# Patient Record
Sex: MALE | Race: BLACK OR AFRICAN AMERICAN | NOT HISPANIC OR LATINO | Employment: FULL TIME | URBAN - METROPOLITAN AREA
[De-identification: names, ages, dates, MRNs, and addresses within clinical notes are randomized per-mention and may not be internally consistent; named-entity substitution may affect disease eponyms.]

---

## 2017-03-07 ENCOUNTER — OFFICE VISIT (OUTPATIENT)
Dept: FAMILY MEDICINE CLINIC | Facility: CLINIC | Age: 57
End: 2017-03-07

## 2017-03-07 VITALS
HEIGHT: 70 IN | TEMPERATURE: 97.2 F | SYSTOLIC BLOOD PRESSURE: 120 MMHG | BODY MASS INDEX: 32.35 KG/M2 | DIASTOLIC BLOOD PRESSURE: 74 MMHG | WEIGHT: 226 LBS | OXYGEN SATURATION: 99 % | HEART RATE: 64 BPM

## 2017-03-07 DIAGNOSIS — M05.79 RHEUMATOID ARTHRITIS INVOLVING MULTIPLE SITES WITH POSITIVE RHEUMATOID FACTOR (HCC): ICD-10-CM

## 2017-03-07 DIAGNOSIS — G44.011 INTRACTABLE EPISODIC CLUSTER HEADACHE: ICD-10-CM

## 2017-03-07 DIAGNOSIS — J06.9 ACUTE URI: Primary | ICD-10-CM

## 2017-03-07 DIAGNOSIS — K12.1 HARD PALATE ULCER: ICD-10-CM

## 2017-03-07 PROCEDURE — 99213 OFFICE O/P EST LOW 20 MIN: CPT | Performed by: FAMILY MEDICINE

## 2017-03-07 RX ORDER — HYDROCODONE BITARTRATE AND ACETAMINOPHEN 5; 325 MG/1; MG/1
1 TABLET ORAL EVERY 6 HOURS PRN
Qty: 20 TABLET | Refills: 0 | Status: SHIPPED | OUTPATIENT
Start: 2017-03-07 | End: 2019-01-15

## 2017-03-07 NOTE — PATIENT INSTRUCTIONS
Patient has been prescribed controlled medications.  Treatment discussed  FELIPA updated and reviewed.  Risk and Benefits discussed.  Per [place].

## 2017-03-07 NOTE — PROGRESS NOTES
Subjective   Marysol Rollins is a 56 y.o. male who is here for   Chief Complaint   Patient presents with   • Mouth Lesions     Patient states he has a sore in his mouth and he's had it for a couple weeks.    .     History of Present Illness   Has typical uri 2 weeks ago. Mostly ok now.  But a persistent sore on right palate remains and a dry cough, up from throat.    Would like refill on norco for his RA    The following portions of the patient's history were reviewed and updated as appropriate: allergies, current medications, past family history, past medical history, past social history, past surgical history and problem list.    Review of Systems    Objective   Physical Exam   Constitutional: He appears well-developed and well-nourished. No distress.   HENT:   Nose: Nose normal.   Mouth/Throat: Oropharynx is clear and moist. Oral lesions present.       Small ulcer   Cardiovascular: Normal rate.    Pulmonary/Chest: Effort normal.   Musculoskeletal: Normal range of motion.   Nursing note and vitals reviewed.      Assessment/Plan   Marysol was seen today for mouth lesions.    Diagnoses and all orders for this visit:    Acute URI    Intractable episodic cluster headache  -     HYDROcodone-acetaminophen (NORCO) 5-325 MG per tablet; Take 1 tablet by mouth Every 6 (Six) Hours As Needed for severe pain (7-10).    Rheumatoid arthritis involving multiple sites with positive rheumatoid factor    Hard palate ulcer      Patient Instructions   Patient has been prescribed controlled medications.  Treatment discussed  FELIPA updated and reviewed.  Risk and Benefits discussed.  Per [place]HB1.      Medications Discontinued During This Encounter   Medication Reason   • promethazine (PHENERGAN) 25 MG tablet Therapy completed   • HYDROcodone-acetaminophen (NORCO) 5-325 MG per tablet Reorder        Return in about 4 months (around 7/7/2017) for Annual physical.    Dr. Juan Fernandez  [facility]  [place], [place]

## 2017-03-28 RX ORDER — PRAVASTATIN SODIUM 40 MG
TABLET ORAL
Qty: 90 TABLET | Refills: 1 | Status: SHIPPED | OUTPATIENT
Start: 2017-03-28 | End: 2020-02-05 | Stop reason: SDUPTHER

## 2017-12-04 ENCOUNTER — HOSPITAL ENCOUNTER (OUTPATIENT)
Dept: LAB | Facility: HOSPITAL | Age: 57
Discharge: HOME | End: 2017-12-04
Attending: NURSE PRACTITIONER | Admitting: NURSE PRACTITIONER

## 2017-12-04 LAB
ALBUMIN SERPL-MCNC: 3.8 G/DL (ref 3.5–4.8)
ALBUMIN/GLOB SERPL: 1.2 {RATIO} (ref 1–1.7)
ALP SERPL-CCNC: 36 IU/L (ref 32–91)
ALT SERPL-CCNC: 19 IU/L (ref 17–63)
ANION GAP SERPL CALC-SCNC: 9.9 MMOL/L (ref 10–20)
AST SERPL-CCNC: 24 IU/L (ref 15–41)
BILIRUB SERPL-MCNC: 0.5 MG/DL (ref 0.3–1.2)
BUN SERPL-MCNC: 10 MG/DL (ref 8–20)
BUN/CREAT SERPL: 7.7 (ref 6.2–20.3)
CALCIUM SERPL-MCNC: 9.1 MG/DL (ref 8.9–10.3)
CCP IGG ANTIBODIES: 167.5 U/ML
CHLORIDE SERPL-SCNC: 106 MMOL/L (ref 101–111)
CHROMATIN AB SERPL-ACNC: <20 [IU]/ML (ref 0–20)
CONV CO2: 25 MMOL/L (ref 22–32)
CONV TOTAL PROTEIN: 7 G/DL (ref 6.1–7.9)
CREAT UR-MCNC: 1.3 MG/DL (ref 0.7–1.2)
CRP SERPL-MCNC: 0.06 MG/DL (ref 0–0.7)
ERYTHROCYTE [DISTWIDTH] IN BLOOD BY AUTOMATED COUNT: 13.5 % (ref 11.5–14.5)
ERYTHROCYTE [SEDIMENTATION RATE] IN BLOOD BY WESTERGREN METHOD: 7 MM/HR (ref 0–20)
GLOBULIN UR ELPH-MCNC: 3.2 G/DL (ref 2.5–3.8)
GLUCOSE SERPL-MCNC: 88 MG/DL (ref 65–99)
HCT VFR BLD AUTO: 44 % (ref 40–54)
HGB BLD-MCNC: 14.2 G/DL (ref 14–18)
MCH RBC QN AUTO: 27.7 PG (ref 26–32)
MCHC RBC AUTO-ENTMCNC: 32.3 G/DL (ref 32–36)
MCV RBC AUTO: 85.6 FL (ref 80–94)
PLATELET # BLD AUTO: 124 10*3/UL (ref 150–450)
PMV BLD AUTO: 10 FL (ref 7.4–10.4)
POTASSIUM SERPL-SCNC: 3.9 MMOL/L (ref 3.6–5.1)
RBC # BLD AUTO: 5.13 10*6/UL (ref 4.6–6)
SODIUM SERPL-SCNC: 137 MMOL/L (ref 136–144)
WBC # BLD AUTO: 4.6 10*3/UL (ref 4.5–11.5)

## 2018-04-09 ENCOUNTER — HOSPITAL ENCOUNTER (OUTPATIENT)
Dept: LAB | Facility: HOSPITAL | Age: 58
Discharge: HOME | End: 2018-04-09
Attending: NURSE PRACTITIONER | Admitting: NURSE PRACTITIONER

## 2018-04-09 ENCOUNTER — HOSPITAL ENCOUNTER (OUTPATIENT)
Dept: RHEUMATOLOGY | Facility: CLINIC | Age: 58
Discharge: HOME | End: 2018-04-09
Attending: NURSE PRACTITIONER | Admitting: NURSE PRACTITIONER

## 2018-04-09 LAB
ALBUMIN SERPL-MCNC: 3.8 G/DL (ref 3.5–4.8)
ALBUMIN/GLOB SERPL: 1.2 {RATIO} (ref 1–1.7)
ALP SERPL-CCNC: 38 IU/L (ref 32–91)
ALT SERPL-CCNC: 25 IU/L (ref 17–63)
ANION GAP SERPL CALC-SCNC: 10.9 MMOL/L (ref 10–20)
AST SERPL-CCNC: 23 IU/L (ref 15–41)
BILIRUB SERPL-MCNC: 0.5 MG/DL (ref 0.3–1.2)
BUN SERPL-MCNC: 14 MG/DL (ref 8–20)
BUN/CREAT SERPL: 10.8 (ref 6.2–20.3)
CALCIUM SERPL-MCNC: 9.2 MG/DL (ref 8.9–10.3)
CHLORIDE SERPL-SCNC: 104 MMOL/L (ref 101–111)
CONV CO2: 26 MMOL/L (ref 22–32)
CONV TOTAL PROTEIN: 7.1 G/DL (ref 6.1–7.9)
CREAT UR-MCNC: 1.3 MG/DL (ref 0.7–1.2)
CRP SERPL-MCNC: 0.05 MG/DL (ref 0–0.7)
ERYTHROCYTE [DISTWIDTH] IN BLOOD BY AUTOMATED COUNT: 13.2 % (ref 11.5–14.5)
GLOBULIN UR ELPH-MCNC: 3.3 G/DL (ref 2.5–3.8)
GLUCOSE SERPL-MCNC: 88 MG/DL (ref 65–99)
HCT VFR BLD AUTO: 43 % (ref 40–54)
HGB BLD-MCNC: 14.3 G/DL (ref 14–18)
MCH RBC QN AUTO: 27.9 PG (ref 26–32)
MCHC RBC AUTO-ENTMCNC: 33.2 G/DL (ref 32–36)
MCV RBC AUTO: 84 FL (ref 80–94)
PLATELET # BLD AUTO: 143 10*3/UL (ref 150–450)
PMV BLD AUTO: 9.6 FL (ref 7.4–10.4)
POTASSIUM SERPL-SCNC: 3.9 MMOL/L (ref 3.6–5.1)
RBC # BLD AUTO: 5.12 10*6/UL (ref 4.6–6)
SODIUM SERPL-SCNC: 137 MMOL/L (ref 136–144)
WBC # BLD AUTO: 5.9 10*3/UL (ref 4.5–11.5)

## 2019-01-15 ENCOUNTER — OFFICE VISIT (OUTPATIENT)
Dept: FAMILY MEDICINE CLINIC | Facility: CLINIC | Age: 59
End: 2019-01-15

## 2019-01-15 VITALS
WEIGHT: 224 LBS | HEIGHT: 70 IN | DIASTOLIC BLOOD PRESSURE: 78 MMHG | BODY MASS INDEX: 32.07 KG/M2 | OXYGEN SATURATION: 98 % | SYSTOLIC BLOOD PRESSURE: 118 MMHG | HEART RATE: 69 BPM

## 2019-01-15 DIAGNOSIS — Z00.00 ROUTINE ADULT HEALTH MAINTENANCE: ICD-10-CM

## 2019-01-15 DIAGNOSIS — N52.03 COMBINED ARTERIAL INSUFFICIENCY AND CORPORO-VENOUS OCCLUSIVE ERECTILE DYSFUNCTION: ICD-10-CM

## 2019-01-15 DIAGNOSIS — E78.2 MIXED HYPERLIPIDEMIA: ICD-10-CM

## 2019-01-15 DIAGNOSIS — Z12.5 SCREENING FOR PROSTATE CANCER: ICD-10-CM

## 2019-01-15 DIAGNOSIS — M05.79 RHEUMATOID ARTHRITIS INVOLVING MULTIPLE SITES WITH POSITIVE RHEUMATOID FACTOR (HCC): Primary | ICD-10-CM

## 2019-01-15 DIAGNOSIS — Z51.81 MEDICATION MONITORING ENCOUNTER: ICD-10-CM

## 2019-01-15 PROCEDURE — 99396 PREV VISIT EST AGE 40-64: CPT | Performed by: FAMILY MEDICINE

## 2019-01-15 PROCEDURE — 90674 CCIIV4 VAC NO PRSV 0.5 ML IM: CPT | Performed by: FAMILY MEDICINE

## 2019-01-15 PROCEDURE — 90471 IMMUNIZATION ADMIN: CPT | Performed by: FAMILY MEDICINE

## 2019-01-15 RX ORDER — MELATONIN
1000 DAILY
COMMUNITY

## 2019-01-15 RX ORDER — CELECOXIB 200 MG/1
200 CAPSULE ORAL DAILY
Qty: 30 CAPSULE | Refills: 1 | Status: SHIPPED | OUTPATIENT
Start: 2019-01-15 | End: 2019-04-16 | Stop reason: SDUPTHER

## 2019-01-15 RX ORDER — SILDENAFIL CITRATE 20 MG/1
20 TABLET ORAL DAILY PRN
Qty: 30 TABLET | Refills: 5 | Status: SHIPPED | OUTPATIENT
Start: 2019-01-15 | End: 2020-02-05 | Stop reason: SDUPTHER

## 2019-01-15 RX ORDER — HYDROXYCHLOROQUINE SULFATE 200 MG/1
200 TABLET, FILM COATED ORAL 2 TIMES DAILY
COMMUNITY
Start: 2018-12-12 | End: 2019-10-15 | Stop reason: SDUPTHER

## 2019-01-15 RX ORDER — LANOLIN ALCOHOL/MO/W.PET/CERES
CREAM (GRAM) TOPICAL DAILY
COMMUNITY
End: 2022-08-23

## 2019-01-15 NOTE — PROGRESS NOTES
"  Chief Complaint   Patient presents with   • Annual Exam   • Back Pain   • Rheumatoid Arthritis   • Hyperlipidemia   • Erectile Dysfunction       Subjective   Marysol Rollins is a 58 y.o. male and is here for a yearly physical exam. The patient reports problems - rheumatoid is stable, on Plaquenil now. ED is a worsening problem.    Do you take any herbs or supplements that were not prescribed by a doctor? yes. If so, these will be added to active medication list.    The following portions of the patient's history were reviewed and updated as appropriate: allergies, current medications, past family history, past medical history, past social history, past surgical history and problem list.    Social and Family and Surgical History reviewed and updated today, see Rooming tab.    Health History, Preventive Measures and Vaccination flow sheets reviewed and updated today.    Patient's current medical chart in Epic; including previous office notes, imaging, labs, specialist's evaluation either in notes or in Media tab reviewed today.    Other pertinent medical information also reviewed thru Care Everywhere function is also reviewed today.    Review of Systems  Review of Systems  A comprehensive review of systems was negative except for: Genitourinary: positive for sexual problems and decreased stream  Musculoskeletal: positive for arthralgias, back pain and stiff joints    Vitals:    01/15/19 1505   BP: 118/78   BP Location: Left arm   Patient Position: Sitting   Pulse: 69   SpO2: 98%   Weight: 102 kg (224 lb)   Height: 177.8 cm (70\")       General Appearance:  Alert, cooperative, no distress, appears stated age   Head:  Normocephalic, without obvious abnormality, atraumatic   Eyes:  PERRL, conjunctiva/corneas clear, EOM's intact.   Ears:  Normal TM's and external ear canals, both ears   Nose: Nares normal, septum midline, mucosa normal, no drainage or sinus tenderness   Throat: Lips, mucosa, and tongue normal; teeth and " gums normal   Neck: Supple, symmetrical, trachea midline, no adenopathy;   thyroid: No enlargement/tenderness/nodules; no carotid  bruit   Back:  Symmetric, no curvature, ROM normal, no CVA tenderness   Lungs:  Clear to auscultation bilaterally, respirations unlabored   Chest wall:  No tenderness or deformity   Heart:  Regular rate and rhythm, S1 and S2 normal, no murmur, rub or gallop   Abdomen:  Soft, non-tender, bowel sounds active all four quadrants,   no masses, no organomegaly   Rectal:        Extremities: Extremities normal, atraumatic, no cyanosis or edema   Pulses: 2+ and symmetric all extremities   Skin: Skin color, texture, turgor normal, no rashes or lesions   Lymph nodes: Cervical, supraclavicular, and axillary nodes normal   Neurologic: CNII-XII intact. Normal strength, sensation and reflexes   throughout            Assessment/Plan   Healthy male exam.  Marysol was seen today for annual exam, back pain, rheumatoid arthritis, hyperlipidemia and erectile dysfunction.    Diagnoses and all orders for this visit:    Rheumatoid arthritis involving multiple sites with positive rheumatoid factor (CMS/HCC)  -     celecoxib (CELEBREX) 200 MG capsule; Take 1 capsule by mouth Daily.    Mixed hyperlipidemia  -     Lipid Panel  -     TSH    Medication monitoring encounter    Routine adult health maintenance  -     Hemoglobin A1c  -     Basic Metabolic Panel    Combined arterial insufficiency and corporo-venous occlusive erectile dysfunction  -     sildenafil (REVATIO) 20 MG tablet; Take 1 tablet by mouth Daily As Needed (ED).    Screening for prostate cancer  -     PSA Screen    Other orders  -     Flucelvax Quad=>4Years (5747-9009)      1. Labs ordered  Try Viagra for ED  Try Celebrex on non prednisone days  2. Patient Counseling:  --Nutrition: Stressed importance of moderation in sodium/caffeine intake, saturated fat and cholesterol.  Discussed caloric balance, sufficient intake of fresh fruits, vegetables,  fiber, calcium, iron.  --Discussed the daily use of baby aspirin, if indicated.not needed  --Exercise: Stressed the importance of regular exercise.   --Substance Abuse: Discussed cessation/primary prevention of tobacco, alcohol, or other drug use; driving or other dangerous activities under the influence.    --Dental health: Discussed importance of regular tooth brushing, flossing, and dental visits.  -- suggested having eyes and vision checked if needed or past due.  --Immunizations reviewed.flu shot today  --Discussed benefits of screening colonoscopy.  3. Discussed the patient's BMI with him.  The BMI is above average; BMI management plan is completed  4. Follow up in one year    There are no Patient Instructions on file for this visit.    Medications Discontinued During This Encounter   Medication Reason   • SUMAtriptan (IMITREX) 50 MG tablet *Therapy completed   • HYDROcodone-acetaminophen (NORCO) 5-325 MG per tablet *Therapy completed   • gabapentin (NEURONTIN) 100 MG capsule *Therapy completed        Dr. Juan Fernandez MD  [place], [place]  [facility]

## 2019-04-16 DIAGNOSIS — M05.79 RHEUMATOID ARTHRITIS INVOLVING MULTIPLE SITES WITH POSITIVE RHEUMATOID FACTOR (HCC): ICD-10-CM

## 2019-04-16 RX ORDER — CELECOXIB 200 MG/1
CAPSULE ORAL
Qty: 30 CAPSULE | Refills: 0 | Status: SHIPPED | OUTPATIENT
Start: 2019-04-16 | End: 2019-05-29 | Stop reason: SDUPTHER

## 2019-04-29 ENCOUNTER — TELEPHONE (OUTPATIENT)
Dept: FAMILY MEDICINE CLINIC | Facility: CLINIC | Age: 59
End: 2019-04-29

## 2019-04-29 RX ORDER — MONTELUKAST SODIUM 10 MG/1
10 TABLET ORAL NIGHTLY
Qty: 30 TABLET | Refills: 1 | Status: SHIPPED | OUTPATIENT
Start: 2019-04-29 | End: 2020-02-05 | Stop reason: SDUPTHER

## 2019-04-29 NOTE — TELEPHONE ENCOUNTER
Pt called requesting something to be called in for his allergies. He has Tried Zyrtec and Claritin OTC without relief.    Ok singulair sent in

## 2019-05-29 DIAGNOSIS — M05.79 RHEUMATOID ARTHRITIS INVOLVING MULTIPLE SITES WITH POSITIVE RHEUMATOID FACTOR (HCC): ICD-10-CM

## 2019-05-29 RX ORDER — CELECOXIB 200 MG/1
CAPSULE ORAL
Qty: 30 CAPSULE | Refills: 1 | Status: SHIPPED | OUTPATIENT
Start: 2019-05-29 | End: 2019-08-17 | Stop reason: SDUPTHER

## 2019-07-03 RX ORDER — PREDNISONE 1 MG/1
TABLET ORAL
Qty: 30 TABLET | Refills: 0 | Status: SHIPPED | OUTPATIENT
Start: 2019-07-03 | End: 2019-10-15 | Stop reason: SDUPTHER

## 2019-08-17 DIAGNOSIS — M05.79 RHEUMATOID ARTHRITIS INVOLVING MULTIPLE SITES WITH POSITIVE RHEUMATOID FACTOR (HCC): ICD-10-CM

## 2019-08-17 RX ORDER — HYDROXYCHLOROQUINE SULFATE 200 MG/1
TABLET, FILM COATED ORAL
Qty: 180 TABLET | Refills: 0 | OUTPATIENT
Start: 2019-08-17

## 2019-08-17 RX ORDER — PREDNISONE 1 MG/1
TABLET ORAL
Qty: 30 TABLET | Refills: 0 | OUTPATIENT
Start: 2019-08-17

## 2019-08-19 RX ORDER — CELECOXIB 200 MG/1
CAPSULE ORAL
Qty: 30 CAPSULE | Refills: 0 | Status: SHIPPED | OUTPATIENT
Start: 2019-08-19 | End: 2019-09-18 | Stop reason: SDUPTHER

## 2019-08-27 RX ORDER — HYDROXYCHLOROQUINE SULFATE 200 MG/1
TABLET, FILM COATED ORAL
Qty: 180 TABLET | Refills: 0 | OUTPATIENT
Start: 2019-08-27

## 2019-08-27 RX ORDER — PREDNISONE 1 MG/1
TABLET ORAL
Qty: 30 TABLET | Refills: 0 | OUTPATIENT
Start: 2019-08-27

## 2019-09-13 ENCOUNTER — TELEPHONE (OUTPATIENT)
Dept: FAMILY MEDICINE CLINIC | Facility: CLINIC | Age: 59
End: 2019-09-13

## 2019-09-13 NOTE — TELEPHONE ENCOUNTER
Pt called stating his rheumatologist  Has retired, wondering if you had any recommendations on who to see?

## 2019-09-13 NOTE — TELEPHONE ENCOUNTER
Pt informed, gave him the phone number to the office he will check on the hours and will call back.

## 2019-09-18 ENCOUNTER — TELEPHONE (OUTPATIENT)
Dept: FAMILY MEDICINE CLINIC | Facility: CLINIC | Age: 59
End: 2019-09-18

## 2019-09-18 DIAGNOSIS — M05.79 RHEUMATOID ARTHRITIS INVOLVING MULTIPLE SITES WITH POSITIVE RHEUMATOID FACTOR (HCC): ICD-10-CM

## 2019-09-18 DIAGNOSIS — M05.711 RHEUMATOID ARTHRITIS INVOLVING RIGHT SHOULDER WITH POSITIVE RHEUMATOID FACTOR (HCC): Primary | ICD-10-CM

## 2019-09-18 RX ORDER — CELECOXIB 200 MG/1
CAPSULE ORAL
Qty: 30 CAPSULE | Refills: 0 | Status: SHIPPED | OUTPATIENT
Start: 2019-09-18 | End: 2019-10-28 | Stop reason: SDUPTHER

## 2019-09-18 NOTE — TELEPHONE ENCOUNTER
Pt called, can the referral be made for the rheumatologist you recommended?      Ok  Referral made    Juan Fernandez MD

## 2019-10-10 PROBLEM — M25.512 PAIN IN JOINT OF LEFT SHOULDER: Status: ACTIVE | Noted: 2017-12-04

## 2019-10-10 PROBLEM — D69.6 THROMBOCYTOPENIA: Status: ACTIVE | Noted: 2018-04-09

## 2019-10-10 PROBLEM — M70.60 TROCHANTERIC BURSITIS: Status: ACTIVE | Noted: 2018-04-09

## 2019-10-15 ENCOUNTER — OFFICE VISIT (OUTPATIENT)
Dept: RHEUMATOLOGY | Facility: CLINIC | Age: 59
End: 2019-10-15

## 2019-10-15 VITALS
HEIGHT: 70 IN | WEIGHT: 233 LBS | BODY MASS INDEX: 33.36 KG/M2 | HEART RATE: 64 BPM | DIASTOLIC BLOOD PRESSURE: 84 MMHG | SYSTOLIC BLOOD PRESSURE: 134 MMHG

## 2019-10-15 DIAGNOSIS — M70.61 TROCHANTERIC BURSITIS OF BOTH HIPS: ICD-10-CM

## 2019-10-15 DIAGNOSIS — M05.79 RHEUMATOID ARTHRITIS WITH RHEUMATOID FACTOR OF MULTIPLE SITES WITHOUT ORGAN OR SYSTEMS INVOLVEMENT (HCC): Primary | ICD-10-CM

## 2019-10-15 DIAGNOSIS — M70.62 TROCHANTERIC BURSITIS OF BOTH HIPS: ICD-10-CM

## 2019-10-15 PROCEDURE — 99213 OFFICE O/P EST LOW 20 MIN: CPT | Performed by: NURSE PRACTITIONER

## 2019-10-15 RX ORDER — HYDROXYCHLOROQUINE SULFATE 200 MG/1
200 TABLET, FILM COATED ORAL 2 TIMES DAILY
Qty: 180 TABLET | Refills: 1 | Status: SHIPPED | OUTPATIENT
Start: 2019-10-15

## 2019-10-15 RX ORDER — PREDNISONE 1 MG/1
5 TABLET ORAL DAILY
Qty: 30 TABLET | Refills: 0 | Status: SHIPPED | OUTPATIENT
Start: 2019-10-15 | End: 2021-02-04

## 2019-10-15 NOTE — PATIENT INSTRUCTIONS
Out of meds X 2 months due to not being seen for over 6 mos.   labs today. X-ray of hands & feet to check for disease progression  Plaquenil 400 mg/d, prednisone taper for symptoms and then may use as needed only for flares.

## 2019-10-15 NOTE — PROGRESS NOTES
Subjective   Marsyol Rollins is a 59 y.o. male.     History of Present Illness     Pt is a pleasant 59 y.o.  male pt with history of RA and bursitis of the hips. Pt here for follow up today. Last seen in the office in February 2019. Labs in March w/ PCP showed normal CRP and ESR, , +IOANA, CBC showed no leukopenia, anemia or thrombocytopenia  Labs on 8-25-18 showed slightly low platelet count at 139, normal CRP & normal creatinine.      He had hand xray in April 2018 that was negative.     Has been out of plaquenil & prednisone for a few months. Was taking plaquenil 400 mg/d. Last eye examination was in July 2019 and was ok for plaquenil.  Having increased pain over the hands, feet & hips. Hands will swell intermittently. Was doing better when he had his medications. Takes prednisone on as needed basis a few times a month if needed.    He tried pennsaid to the hips for bursitis and this does help  Stiffness lasts about a hour, hands, feet ache. Hands swell.      ROS: Denies fever/chills. NO nasal or oral lesions. Dneies dry eyes or mouth. No skin rashes or psoriasis, no CP, SOA, N/V/D. Energy level is slightly low. Feels sluggish at times. Wants to try to work out more. Denies dysphagia or raynauds. Denies   diabetes or thyroid disease. The remainder of the review of systems reviewed and are (-).                            The following portions of the patient's history were reviewed and updated as appropriate: current medications, past family history, past medical history, past social history, past surgical history and problem list.       Review of Systems   Constitutional:        See HPI       Objective   Physical Exam   Constitutional: He is oriented to person, place, and time. He appears well-developed and well-nourished.   HENT:   Head: Normocephalic and atraumatic.   Nose: Nose normal.   Eyes: Conjunctivae are normal. Pupils are equal, round, and reactive to light.   Cardiovascular: Normal rate  and regular rhythm.   Pulmonary/Chest: Effort normal and breath sounds normal.   Musculoskeletal:   Mild decreased ROM Over the bilateral hips and bilateral MCPs, PIPs  He has some tenderness one examination over the bilateral PIP joints. Tenderness is noted over the bilateral trochanteric bursa.   No swelling is noted on examination.    Neurological: He is alert and oriented to person, place, and time.   Skin: Skin is warm and dry.   Psychiatric: He has a normal mood and affect.   Vitals reviewed.        Assessment/Plan   Marysol was seen today for rheumatoid arthritis.    Diagnoses and all orders for this visit:    Rheumatoid arthritis with rheumatoid factor of multiple sites without organ or systems involvement (CMS/[facility])  Comments:  Out of meds X 2 months  labs today. X-ray of hands & feet to check for disease progression  Plaquenil 400 mg/d, prednisone taper for symptoms.  Orders:  -     CBC With Manual Differential; Future  -     Comprehensive Metabolic Panel; Future  -     C-reactive Protein; Future  -     C3 Complement; Future  -     C4 Complement; Future  -     Urinalysis With Culture If Indicated -; Future  -     Sedimentation Rate; Future  -     XR Hand 3+ View Bilateral; Future  -     XR Foot 3+ View Bilateral; Future    Trochanteric bursitis of both hips  Comments:  May use moist heat, stretches, pennsaid as needed  If no improvement w/ conservative measures pt will let me know        Patient Instructions   Out of meds X 2 months due to not being seen for over 6 mos.   labs today. X-ray of hands & feet to check for disease progression  Plaquenil 400 mg/d, prednisone taper for symptoms and then may use as needed only for flares.

## 2019-10-28 DIAGNOSIS — M05.79 RHEUMATOID ARTHRITIS INVOLVING MULTIPLE SITES WITH POSITIVE RHEUMATOID FACTOR (HCC): ICD-10-CM

## 2019-10-28 RX ORDER — CELECOXIB 200 MG/1
CAPSULE ORAL
Qty: 30 CAPSULE | Refills: 0 | Status: SHIPPED | OUTPATIENT
Start: 2019-10-28 | End: 2020-01-14

## 2020-01-13 DIAGNOSIS — M05.79 RHEUMATOID ARTHRITIS INVOLVING MULTIPLE SITES WITH POSITIVE RHEUMATOID FACTOR (HCC): ICD-10-CM

## 2020-01-14 RX ORDER — CELECOXIB 200 MG/1
CAPSULE ORAL
Qty: 30 CAPSULE | Refills: 0 | Status: SHIPPED | OUTPATIENT
Start: 2020-01-14 | End: 2020-02-05 | Stop reason: SDUPTHER

## 2020-02-05 ENCOUNTER — OFFICE VISIT (OUTPATIENT)
Dept: FAMILY MEDICINE CLINIC | Facility: CLINIC | Age: 60
End: 2020-02-05

## 2020-02-05 VITALS
WEIGHT: 232 LBS | BODY MASS INDEX: 33.21 KG/M2 | HEIGHT: 70 IN | SYSTOLIC BLOOD PRESSURE: 120 MMHG | DIASTOLIC BLOOD PRESSURE: 78 MMHG | HEART RATE: 62 BPM | OXYGEN SATURATION: 100 %

## 2020-02-05 DIAGNOSIS — M05.79 RHEUMATOID ARTHRITIS WITH RHEUMATOID FACTOR OF MULTIPLE SITES WITHOUT ORGAN OR SYSTEMS INVOLVEMENT (HCC): ICD-10-CM

## 2020-02-05 DIAGNOSIS — J30.2 SEASONAL ALLERGIES: ICD-10-CM

## 2020-02-05 DIAGNOSIS — E78.2 MIXED HYPERLIPIDEMIA: ICD-10-CM

## 2020-02-05 DIAGNOSIS — Z00.00 ROUTINE ADULT HEALTH MAINTENANCE: Primary | ICD-10-CM

## 2020-02-05 DIAGNOSIS — Z12.11 SCREENING FOR COLON CANCER: ICD-10-CM

## 2020-02-05 DIAGNOSIS — Z12.5 SCREENING FOR PROSTATE CANCER: ICD-10-CM

## 2020-02-05 DIAGNOSIS — N52.03 COMBINED ARTERIAL INSUFFICIENCY AND CORPORO-VENOUS OCCLUSIVE ERECTILE DYSFUNCTION: ICD-10-CM

## 2020-02-05 DIAGNOSIS — M05.79 RHEUMATOID ARTHRITIS INVOLVING MULTIPLE SITES WITH POSITIVE RHEUMATOID FACTOR (HCC): ICD-10-CM

## 2020-02-05 PROBLEM — M25.512 PAIN IN JOINT OF LEFT SHOULDER: Status: RESOLVED | Noted: 2017-12-04 | Resolved: 2020-02-05

## 2020-02-05 PROBLEM — M70.60 TROCHANTERIC BURSITIS: Status: RESOLVED | Noted: 2018-04-09 | Resolved: 2020-02-05

## 2020-02-05 PROCEDURE — 99396 PREV VISIT EST AGE 40-64: CPT | Performed by: FAMILY MEDICINE

## 2020-02-05 RX ORDER — PRAVASTATIN SODIUM 40 MG
40 TABLET ORAL DAILY
Qty: 90 TABLET | Refills: 3 | Status: SHIPPED | OUTPATIENT
Start: 2020-02-05 | End: 2020-07-23

## 2020-02-05 RX ORDER — SILDENAFIL CITRATE 20 MG/1
20 TABLET ORAL DAILY PRN
Qty: 30 TABLET | Refills: 5 | Status: SHIPPED | OUTPATIENT
Start: 2020-02-05 | End: 2021-08-16 | Stop reason: SDUPTHER

## 2020-02-05 RX ORDER — CELECOXIB 200 MG/1
200 CAPSULE ORAL DAILY
Qty: 90 CAPSULE | Refills: 3 | Status: SHIPPED | OUTPATIENT
Start: 2020-02-05 | End: 2021-08-16 | Stop reason: SDUPTHER

## 2020-02-05 RX ORDER — MONTELUKAST SODIUM 10 MG/1
10 TABLET ORAL NIGHTLY
Qty: 30 TABLET | Refills: 1 | Status: SHIPPED | OUTPATIENT
Start: 2020-02-05 | End: 2022-01-19

## 2020-02-05 NOTE — PROGRESS NOTES
"  Chief Complaint   Patient presents with   • Annual Exam       Subjective   Marysol Rollins is a 59 y.o. male and is here for a yearly physical exam. The patient reports problems - will be seeing sylvia Kaye MD in March. dr De Luna has retired.    Do you take any herbs or supplements that were not prescribed by a doctor? yes. If so, these will be added to active medication list.    The following portions of the patient's history were reviewed and updated as appropriate: allergies, current medications, past family history, past medical history, past social history, past surgical history and problem list.    Social and Family and Surgical History reviewed and updated today, see Rooming tab.    Health History, Preventive Measures and Vaccination flow sheets reviewed and updated today.    Patient's current medical chart in Epic; including previous office notes, imaging, labs, specialist's evaluation either in notes or in Media tab reviewed today.    Other pertinent medical information also reviewed thru Care Everywhere function is also reviewed today.    Review of Systems  Review of Systems  A comprehensive review of systems was negative except for: Musculoskeletal: positive for stiff joints    Vitals:    02/05/20 1508   BP: 120/78   BP Location: Left arm   Patient Position: Sitting   Cuff Size: Adult   Pulse: 62   SpO2: 100%   Weight: 105 kg (232 lb)   Height: 177.8 cm (70\")       General Appearance:  Alert, cooperative, no distress, appears stated age   Head:  Normocephalic, without obvious abnormality, atraumatic   Eyes:  PERRL, conjunctiva/corneas clear, EOM's intact.   Ears:  Normal TM's and external ear canals, both ears   Nose: Nares normal, septum midline, mucosa normal, no drainage or sinus tenderness   Throat: Lips, mucosa, and tongue normal; teeth and gums normal   Neck: Supple, symmetrical, trachea midline, no adenopathy;   thyroid: No enlargement/tenderness/nodules; no carotid  bruit   Back:  Symmetric, no " curvature, ROM normal, no CVA tenderness   Lungs:  Clear to auscultation bilaterally, respirations unlabored   Chest wall:  No tenderness or deformity   Heart:  Regular rate and rhythm, S1 and S2 normal, no murmur, rub or gallop   Abdomen:  Soft, non-tender, bowel sounds active all four quadrants,   no masses, no organomegaly   Rectal:        Extremities: Extremities normal, atraumatic, no cyanosis or edema   Pulses: 2+ and symmetric all extremities   Skin: Skin color, texture, turgor normal, no rashes or lesions   Lymph nodes: Cervical, supraclavicular, and axillary nodes normal   Neurologic: CNII-XII intact. Normal strength, sensation and reflexes   throughout            Assessment/Plan   Healthy male exam.  Marysol was seen today for annual exam.    Diagnoses and all orders for this visit:    Routine adult health maintenance  -     TSH  -     PSA Screen  -     LDL Cholesterol, Direct    Rheumatoid arthritis with rheumatoid factor of multiple sites without organ or systems involvement (CMS/HCC)    Mixed hyperlipidemia  -     TSH  -     LDL Cholesterol, Direct  -     pravastatin (PRAVACHOL) 40 MG tablet; Take 1 tablet by mouth Daily.    Screening for prostate cancer  -     PSA Screen    Screening for colon cancer  -     Ambulatory Referral For Screening Colonoscopy    Rheumatoid arthritis involving multiple sites with positive rheumatoid factor (CMS/HCC)  -     celecoxib (CeleBREX) 200 MG capsule; Take 1 capsule by mouth Daily. Indications: Rheumatoid Arthritis    Combined arterial insufficiency and corporo-venous occlusive erectile dysfunction  -     sildenafil (REVATIO) 20 MG tablet; Take 1 tablet by mouth Daily As Needed (ED).    Seasonal allergies  -     montelukast (SINGULAIR) 10 MG tablet; Take 1 tablet by mouth Every Night.      1. No new labs  2. Patient Counseling:  --Nutrition: Stressed importance of moderation in sodium/caffeine intake, saturated fat and cholesterol.  Discussed caloric balance,  sufficient intake of fresh fruits, vegetables, fiber, calcium, iron.  ---Exercise: Stressed the importance of regular exercise.   --Substance Abuse: Discussed cessation/primary prevention of tobacco, alcohol, or other drug use; driving or other dangerous activities under the influence.    --Dental health: Discussed importance of regular tooth brushing, flossing, and dental visits.  -- suggested having eyes and vision checked if needed or past due.  --Immunizations reviewed.  --Discussed benefits of screening colonoscopy.is due, for 10 yr f/u. Referral made  3. Discussed the patient's BMI with him.  The BMI is above average; BMI management plan is completed  4. Follow up in one year    There are no Patient Instructions on file for this visit.    Medications Discontinued During This Encounter   Medication Reason   • folic acid (FOLVITE) 1 MG tablet *Therapy completed   • celecoxib (CeleBREX) 200 MG capsule Reorder   • montelukast (SINGULAIR) 10 MG tablet Reorder   • pravastatin (PRAVACHOL) 40 MG tablet Reorder   • sildenafil (REVATIO) 20 MG tablet Reorder        Dr. Juan Fernandez MD  [place], [place]  [facility]

## 2020-02-06 LAB
LDLC SERPL DIRECT ASSAY-MCNC: 146 MG/DL (ref 0–100)
PSA SERPL-MCNC: 2.35 NG/ML (ref 0–4)
TSH SERPL DL<=0.005 MIU/L-ACNC: 1.56 UIU/ML (ref 0.27–4.2)

## 2020-03-12 ENCOUNTER — TELEPHONE (OUTPATIENT)
Dept: FAMILY MEDICINE CLINIC | Facility: CLINIC | Age: 60
End: 2020-03-12

## 2020-03-26 ENCOUNTER — TELEPHONE (OUTPATIENT)
Dept: FAMILY MEDICINE CLINIC | Facility: CLINIC | Age: 60
End: 2020-03-26

## 2020-03-26 NOTE — TELEPHONE ENCOUNTER
I called pt Re: Hosp. F/U appt on 4/7/2020, he said that he wanted to cancel it for now.  I offered Video Visit options and he was not interested in that.  He just wanted to make sure that Dr. HERNANDEZ seen his hospital records ( they are under the Media tab), he said that he is feeling much better but isn't comfortable getting out right now.  He said that he does live in [place] and they aren't supposed to be going anywhere right now.

## 2020-04-09 ENCOUNTER — TELEPHONE (OUTPATIENT)
Dept: FAMILY MEDICINE CLINIC | Facility: CLINIC | Age: 60
End: 2020-04-09

## 2020-04-09 NOTE — TELEPHONE ENCOUNTER
Returned call to pt regarding allergy med questions. He stated he never got it. I advised that he call his pharmacy to verify they received it and if not he can either call us or they can call.

## 2020-05-27 RX ORDER — PREDNISONE 1 MG/1
TABLET ORAL
Qty: 30 TABLET | Refills: 0 | OUTPATIENT
Start: 2020-05-27

## 2020-07-23 ENCOUNTER — OFFICE VISIT (OUTPATIENT)
Dept: FAMILY MEDICINE CLINIC | Facility: CLINIC | Age: 60
End: 2020-07-23

## 2020-07-23 VITALS
HEART RATE: 61 BPM | HEIGHT: 70 IN | WEIGHT: 215.6 LBS | OXYGEN SATURATION: 99 % | SYSTOLIC BLOOD PRESSURE: 130 MMHG | BODY MASS INDEX: 30.86 KG/M2 | DIASTOLIC BLOOD PRESSURE: 86 MMHG

## 2020-07-23 DIAGNOSIS — M05.79 RHEUMATOID ARTHRITIS WITH RHEUMATOID FACTOR OF MULTIPLE SITES WITHOUT ORGAN OR SYSTEMS INVOLVEMENT (HCC): ICD-10-CM

## 2020-07-23 DIAGNOSIS — Z12.11 SCREENING FOR COLON CANCER: ICD-10-CM

## 2020-07-23 DIAGNOSIS — I25.119 CORONARY ARTERY DISEASE INVOLVING NATIVE CORONARY ARTERY OF NATIVE HEART WITH ANGINA PECTORIS (HCC): ICD-10-CM

## 2020-07-23 DIAGNOSIS — N43.3 RIGHT HYDROCELE: Primary | ICD-10-CM

## 2020-07-23 PROCEDURE — 99214 OFFICE O/P EST MOD 30 MIN: CPT | Performed by: FAMILY MEDICINE

## 2020-07-23 RX ORDER — NITROGLYCERIN 0.4 MG/1
0.4 TABLET SUBLINGUAL
COMMUNITY
End: 2023-02-13 | Stop reason: SDUPTHER

## 2020-07-23 RX ORDER — CLOPIDOGREL BISULFATE 75 MG/1
75 TABLET ORAL DAILY
COMMUNITY
End: 2022-01-19 | Stop reason: SDUPTHER

## 2020-07-23 RX ORDER — ATORVASTATIN CALCIUM 40 MG/1
40 TABLET, FILM COATED ORAL DAILY
COMMUNITY
End: 2022-01-19 | Stop reason: SDUPTHER

## 2020-07-23 NOTE — PROGRESS NOTES
Subjective   Marysol Rollins is a 59 y.o. male who is here for   Chief Complaint   Patient presents with   • Rectal Bleeding     saw twice after bowel movement 2-3 days ago    • Testicle Pain   • Coronary Artery Disease   .     History of Present Illness     Mr. Rollins returns today with multiple problems.  On top of list he suffered a myocardial infarction back in March of this year.  He had 3 days of fairly classic sounding angina went to [facility] where he was admitted.  And had 2 stents placed.  Not sure of the name of his cardiologist at this point.  But has had 2 follow-ups.  Update his med list to add Plavix and move his statin from pravastatin or Lipitor.  Is having no chest pain at this point.    The fullness in his right scrotum is getting worse.  We examine his about a year ago his clinical diagnosis was a hydrocele.  But it seems begetting larger with some mild discomfort.  On exam today it is about the size of a racquetball on the right.  Left testicle feels fairly normal, will refer him to see Dr. Jamari Oates urology over in [facility] for follow-up.    Patient also had bright red blood on the toilet paper after BM this occurred 3 days ago for 2 BMs in a row.  He has a history of hemorrhoids and anal fissure.  Last BM last couple days or is been no blood.  We discussed the fact he is on Plavix at this point.    Also reports that he routinely bites the right posterior tongue due to a partial on his teeth it does not fit very well and he spit up some blood for day due to that.  Again probably due to the Plavix and the bleeding has since stopped.    We discussed his BPH was on Flomax at one time but seemed to have retrograde ejaculation.  He is now on a over-the-counter supplement which has saw palmetto amongst a lot of other things.  We read the ingredients as a high level vitamin E which would not be a good idea with somebody has had a heart attack with stents asked him to throw this  so went away and go back to Lisa duffy by just plain saw palmetto.    His rheumatoid arthritis is fairly stable on Celebrex and Plaquenil he is current with his rheumatologist    Lastly he missed his screening colonoscopy due to COVID-19 pandemic.  I gave him Dr. Tristan Herr's name and number at gastroenterology of [facility]            The following portions of the patient's history were reviewed and updated as appropriate: allergies, current medications, past family history, past medical history, past social history, past surgical history and problem list.    Review of Systems    Objective   Physical Exam   Constitutional: He appears well-developed and well-nourished. No distress.   HENT:   Mouth/Throat: Oropharynx is clear and moist.   Cardiovascular: Normal rate.   Pulmonary/Chest: Effort normal.   Genitourinary: Penis normal. Right testis shows mass. Left testis shows no mass and no tenderness. Circumcised.   Neurological: He is alert.   Nursing note and vitals reviewed.      Current outpatient and discharge medications have been reconciled for the patient.  Reviewed by: Juan Fernandez MD      Assessment/Plan   Marysol was seen today for rectal bleeding, testicle pain and coronary artery disease.    Diagnoses and all orders for this visit:    Right hydrocele  -     Ambulatory Referral to Urology    Coronary artery disease involving native coronary artery of native heart with angina pectoris (CMS/HCC)    Rheumatoid arthritis with rheumatoid factor of multiple sites without organ or systems involvement (CMS/HCC)    Screening for colon cancer      There are no Patient Instructions on file for this visit.    Medications Discontinued During This Encounter   Medication Reason   • pravastatin (PRAVACHOL) 40 MG tablet *Therapy completed        Return in about 4 months (around 11/23/2020).    Dr. Juan Fernandez  [facility] [place], [place]

## 2020-09-23 ENCOUNTER — TELEPHONE (OUTPATIENT)
Dept: FAMILY MEDICINE CLINIC | Facility: CLINIC | Age: 60
End: 2020-09-23

## 2020-09-23 NOTE — TELEPHONE ENCOUNTER
Bill Oseguera MD  Cardiology  [facility]  [address]  [address]  [place] [address]    He works at [employer]

## 2020-09-23 NOTE — TELEPHONE ENCOUNTER
PATIENT STATES HE HAD HEART SURGERY IN MARCH AND NOW HIS HEART DR IS NO LONGER AT THE HOSPITAL . HE WOULD LIKE TO KNOW WHO HE SHOULD FOLLOW UP WITH.      PLEASE ADVISE  [phone]

## 2020-10-29 ENCOUNTER — TELEPHONE (OUTPATIENT)
Dept: FAMILY MEDICINE CLINIC | Facility: CLINIC | Age: 60
End: 2020-10-29

## 2020-10-29 NOTE — TELEPHONE ENCOUNTER
MR. EATON IS NEEDING A LETTER STATING THAT HE HAS UNDERLYING HEALTH CONDITIONS, HE IS HIGH RISK  FOR COVID 19.       Marysol Eaton (Self) [phone]

## 2020-10-30 NOTE — TELEPHONE ENCOUNTER
Patient works for [employer] and is needing a letter for if they return to in person this year just for them to have on file.  Please advise if ok

## 2020-10-30 NOTE — TELEPHONE ENCOUNTER
Yes , okay to type up letter  He is on immunosuppressive medications and is at high risk for covid 19 complications

## 2020-11-02 NOTE — TELEPHONE ENCOUNTER
PT IS WANTING TO KNOW IF THE LETTER CAN ACTUALLY BE EMAILED TO [email or web address] AS HE IS UNABLE TO ACCESS IT IN HIS [email or web address] AND SEND IT TO HIS PLACE OF EMPLOYMENT.     PT IS REQUESTING A CALL BACK WHENEVER THIS IS SENT.

## 2021-01-21 ENCOUNTER — TELEPHONE (OUTPATIENT)
Dept: FAMILY MEDICINE CLINIC | Facility: CLINIC | Age: 61
End: 2021-01-21

## 2021-02-01 ENCOUNTER — TELEPHONE (OUTPATIENT)
Dept: FAMILY MEDICINE CLINIC | Facility: CLINIC | Age: 61
End: 2021-02-01

## 2021-02-01 DIAGNOSIS — I25.119 CORONARY ARTERY DISEASE INVOLVING NATIVE CORONARY ARTERY OF NATIVE HEART WITH ANGINA PECTORIS (HCC): ICD-10-CM

## 2021-02-01 DIAGNOSIS — I25.119 CORONARY ARTERY DISEASE INVOLVING NATIVE CORONARY ARTERY OF NATIVE HEART WITH ANGINA PECTORIS: Primary | ICD-10-CM

## 2021-02-01 DIAGNOSIS — Z20.822 CLOSE EXPOSURE TO COVID-19 VIRUS: ICD-10-CM

## 2021-02-01 DIAGNOSIS — Z12.5 SCREENING FOR PROSTATE CANCER: ICD-10-CM

## 2021-02-01 NOTE — TELEPHONE ENCOUNTER
PATIENT WOULD LIKE DIRECTION ON WHERE TO BE TESTED FOR COVID AND ALSO WOULD LIKE ORDERS TO GET THE ANTI-BODIES LAB TEST BEFORE HIS VACCINE APPOINTMENT ON 2/5 .  PATIENT WOULD ALSO LIKE TO HAVE CLARIFICATION REGARDING IF YOU HAVE HAD COVID AND RECEIVE THE VACCINATION, THAT YOU WOULD BECOME VERY ILL ?    PLEASE ADVISE [phone].

## 2021-02-01 NOTE — TELEPHONE ENCOUNTER
Please advise     I have ordered all his routine labs and added on covid 19 antibodies  He may come in for his labs then see me the next week for his annual exam and i will  him on all his inquires    Juan Fernandez MD

## 2021-02-02 ENCOUNTER — TELEPHONE (OUTPATIENT)
Dept: FAMILY MEDICINE CLINIC | Facility: CLINIC | Age: 61
End: 2021-02-02

## 2021-02-02 NOTE — TELEPHONE ENCOUNTER
Caller: Marysol Rollins    Relationship to patient: Self    Best call back number: [phone]    Patient is needing: PATIENT IS CALLING IN REGARDS TO HIM GOING TO GET LAB WORK DONE AT [facility] THIS MORNING BUT WHEN HE ARRIVED, THEY STATED THAT THE LAB ORDERS THAT WERE FAXED OVER TO THEM DID NOT HAVE A SIGNATURE ON THEM SO THEY WERE UNABLE  TO DRAW PATIENTS LABS. PLEASE CONTACT PATIENT WHEN SIGNED LAB ORDERS ARE SENT TO [facility].     PLEASE ADVISE

## 2021-02-04 ENCOUNTER — OFFICE VISIT (OUTPATIENT)
Dept: FAMILY MEDICINE CLINIC | Facility: CLINIC | Age: 61
End: 2021-02-04

## 2021-02-04 VITALS
BODY MASS INDEX: 32.21 KG/M2 | WEIGHT: 225 LBS | HEIGHT: 70 IN | HEART RATE: 67 BPM | DIASTOLIC BLOOD PRESSURE: 78 MMHG | OXYGEN SATURATION: 100 % | SYSTOLIC BLOOD PRESSURE: 120 MMHG

## 2021-02-04 DIAGNOSIS — M05.79 RHEUMATOID ARTHRITIS WITH RHEUMATOID FACTOR OF MULTIPLE SITES WITHOUT ORGAN OR SYSTEMS INVOLVEMENT (HCC): Primary | ICD-10-CM

## 2021-02-04 DIAGNOSIS — I25.119 CORONARY ARTERY DISEASE INVOLVING NATIVE CORONARY ARTERY OF NATIVE HEART WITH ANGINA PECTORIS (HCC): ICD-10-CM

## 2021-02-04 PROCEDURE — 99213 OFFICE O/P EST LOW 20 MIN: CPT | Performed by: FAMILY MEDICINE

## 2021-02-04 NOTE — PROGRESS NOTES
Subjective   Marysol Rollins is a 60 y.o. male who is here for   Chief Complaint   Patient presents with   • Follow-up     discuss covid vaccine/ labs    .   PATIENT WOULD LIKE DIRECTION ON WHERE TO BE TESTED FOR COVID AND ALSO WOULD LIKE ORDERS TO GET THE ANTI-BODIES LAB TEST BEFORE HIS VACCINE APPOINTMENT ON 2/5 .  PATIENT WOULD ALSO LIKE TO HAVE CLARIFICATION REGARDING IF YOU HAVE HAD COVID AND RECEIVE THE VACCINATION, THAT YOU WOULD BECOME VERY ILL ?    PATIENT IS CALLING IN REGARDS TO HIM GOING TO GET LAB WORK DONE AT [facility] THIS MORNING BUT WHEN HE ARRIVED, THEY STATED THAT THE LAB ORDERS THAT WERE FAXED OVER TO THEM DID NOT HAVE A SIGNATURE ON THEM SO THEY WERE UNABLE  TO DRAW PATIENTS LABS. PLEASE CONTACT PATIENT WHEN SIGNED LAB ORDERS ARE SENT TO [facility].      PLEASE ADVISE      History of Present Illness   Reviewed Mr. Rollins's labs from [facility].  Basic labs all normal.  PSA normal.  TSH normal.  His LDL is to goal.  As he is status post coronary artery stent placement spring 2020.    He has rheumatoid arthritis.  On Plaquenil.  Had lots of questions about the COVID-19 vaccine.  He has tested positive for COVID-19 antibodies.  He does not recall any time when he was sick when that could have possibly have happened.  He does have an increased risk of side effects when receiving the vaccine.  But as he is a teacher with autoimmune disease.  He still needs to proceed on COVID-19 vaccine with caution.        The following portions of the patient's history were reviewed and updated as appropriate: allergies, current medications, past family history, past medical history, past social history, past surgical history and problem list.    Review of Systems    Objective   Physical Exam  Cardiovascular:      Rate and Rhythm: Normal rate.   Pulmonary:      Effort: Pulmonary effort is normal.   Neurological:      General: No focal deficit present.      Mental Status: He is  alert.         Assessment/Plan   Diagnoses and all orders for this visit:    1. Rheumatoid arthritis with rheumatoid factor of multiple sites without organ or systems involvement (CMS/[facility]) (Primary)      There are no Patient Instructions on file for this visit.    Medications Discontinued During This Encounter   Medication Reason   • predniSONE (DELTASONE) 5 MG tablet *Therapy completed        No follow-ups on file.    Dr. Juan Fernandez  [place], [place]

## 2021-03-09 ENCOUNTER — TELEPHONE (OUTPATIENT)
Dept: FAMILY MEDICINE CLINIC | Facility: CLINIC | Age: 61
End: 2021-03-09

## 2021-03-09 DIAGNOSIS — I25.119 CORONARY ARTERY DISEASE INVOLVING NATIVE CORONARY ARTERY OF NATIVE HEART WITH ANGINA PECTORIS: Primary | ICD-10-CM

## 2021-03-09 NOTE — TELEPHONE ENCOUNTER
Please advise.     I will make a new referral for a cardiologist.  Dr. Nestor Oseguera.  He works in [facility]    Also okay to hold the clopidogrel for 5 days for upcoming colonoscopy.  It has been 1 year since her MI and stent placement.  Restart the clopidogrel the day after colonoscopy

## 2021-03-09 NOTE — TELEPHONE ENCOUNTER
Caller: Marysol Rollins    Relationship to patient: Self    Best call back number: [phone]     Patient is needing: Patient called in and stated he has a question about clopidogrel (PLAVIX) 75 MG tablet  And has colonoscopy scheduled and cannot take this for 5 days prior to procedure , patient wanted to know if he should do that considering the procedure he had done on his heart ?  Patient also is requesting a referral to a cardiologist  and at one point patient had a list of names and can no longer locate those . Please call patient and advise .

## 2021-08-06 DIAGNOSIS — Z12.5 SCREENING FOR PROSTATE CANCER: Primary | ICD-10-CM

## 2021-08-06 DIAGNOSIS — Z00.00 ROUTINE ADULT HEALTH MAINTENANCE: ICD-10-CM

## 2021-08-06 DIAGNOSIS — E78.2 MIXED HYPERLIPIDEMIA: ICD-10-CM

## 2021-08-06 DIAGNOSIS — D69.6 THROMBOCYTOPENIA (HCC): ICD-10-CM

## 2021-08-06 DIAGNOSIS — M05.79 RHEUMATOID ARTHRITIS WITH RHEUMATOID FACTOR OF MULTIPLE SITES WITHOUT ORGAN OR SYSTEMS INVOLVEMENT (HCC): ICD-10-CM

## 2021-08-16 ENCOUNTER — OFFICE VISIT (OUTPATIENT)
Dept: FAMILY MEDICINE CLINIC | Facility: CLINIC | Age: 61
End: 2021-08-16

## 2021-08-16 VITALS
HEART RATE: 75 BPM | OXYGEN SATURATION: 98 % | HEIGHT: 70 IN | WEIGHT: 227 LBS | DIASTOLIC BLOOD PRESSURE: 70 MMHG | TEMPERATURE: 97.1 F | BODY MASS INDEX: 32.5 KG/M2 | SYSTOLIC BLOOD PRESSURE: 120 MMHG

## 2021-08-16 DIAGNOSIS — N52.03 COMBINED ARTERIAL INSUFFICIENCY AND CORPORO-VENOUS OCCLUSIVE ERECTILE DYSFUNCTION: ICD-10-CM

## 2021-08-16 DIAGNOSIS — Z00.00 ROUTINE ADULT HEALTH MAINTENANCE: Primary | ICD-10-CM

## 2021-08-16 DIAGNOSIS — M05.79 RHEUMATOID ARTHRITIS INVOLVING MULTIPLE SITES WITH POSITIVE RHEUMATOID FACTOR (HCC): ICD-10-CM

## 2021-08-16 PROBLEM — I21.9 ACUTE MYOCARDIAL INFARCTION: Status: ACTIVE | Noted: 2021-08-16

## 2021-08-16 PROBLEM — K12.1 HARD PALATE ULCER: Status: RESOLVED | Noted: 2017-03-07 | Resolved: 2021-08-16

## 2021-08-16 PROCEDURE — 99396 PREV VISIT EST AGE 40-64: CPT | Performed by: FAMILY MEDICINE

## 2021-08-16 RX ORDER — ZOSTER VACCINE RECOMBINANT, ADJUVANTED 50 MCG/0.5
50 KIT INTRAMUSCULAR
Qty: 1 EACH | Refills: 1 | Status: SHIPPED | OUTPATIENT
Start: 2021-08-16 | End: 2022-02-13

## 2021-08-16 RX ORDER — SILDENAFIL CITRATE 20 MG/1
20 TABLET ORAL DAILY PRN
Qty: 30 TABLET | Refills: 5 | Status: SHIPPED | OUTPATIENT
Start: 2021-08-16 | End: 2022-08-23 | Stop reason: SDUPTHER

## 2021-08-16 RX ORDER — CELECOXIB 200 MG/1
200 CAPSULE ORAL DAILY
Qty: 90 CAPSULE | Refills: 3 | Status: SHIPPED | OUTPATIENT
Start: 2021-08-16 | End: 2022-08-23 | Stop reason: SDUPTHER

## 2021-08-16 NOTE — PROGRESS NOTES
"  Chief Complaint   Patient presents with   • Annual Exam       Subjective   Marysol Rollins is a 60 y.o. male and is here for a yearly physical exam. The patient reports problems - persistent RA, morning stiffness and over all joint pain. needs Celebrex refill.    Do you take any herbs or supplements that were not prescribed by a doctor? yes. If so, these will be added to active medication list.    The following portions of the patient's history were reviewed and updated as appropriate: allergies, current medications, past family history, past medical history, past social history, past surgical history and problem list.    Social and Family and Surgical History reviewed and updated today, see Rooming tab.    Health History, Preventive Measures and Vaccination flow sheets reviewed and updated today.    Patient's current medical chart in Epic; including previous office notes, Surface [employer] messages, recent phone calls, imaging, labs, specialist's evaluation either in notes or in Media tab reviewed today.    Other outside pertinent medical information also reviewed thru Care Everywhere function is also reviewed today.    Review of Systems  Review of Systems  A comprehensive review of systems was negative except for: Genitourinary: positive for sexual problems  Musculoskeletal: positive for arthralgias, back pain and stiff joints  Allergic/Immunologic: positive for hay fever    Vitals:    08/16/21 1509   BP: 120/70   BP Location: Left arm   Patient Position: Sitting   Cuff Size: Large Adult   Pulse: 75   Temp: 97.1 °F (36.2 °C)   TempSrc: Temporal   SpO2: 98%   Weight: 103 kg (227 lb)   Height: 177.8 cm (70\")       General Appearance:  Alert, cooperative, no distress, appears stated age   Head:  Normocephalic, without obvious abnormality, atraumatic   Eyes:  PERRL, conjunctiva/corneas clear, EOM's intact.   Ears:  Normal TM's and external ear canals, both ears   Nose: Nares normal, septum midline, mucosa normal, no drainage " or sinus tenderness   Throat: Lips, mucosa, and tongue normal; teeth and gums normal   Neck: Supple, symmetrical, trachea midline, no adenopathy;   thyroid: No enlargement/tenderness/nodules; no carotid  bruit   Back:  Symmetric, no curvature, ROM normal, no CVA tenderness   Lungs:  Clear to auscultation bilaterally, respirations unlabored   Chest wall:  No tenderness or deformity   Heart:  Regular rate and rhythm, S1 and S2 normal, no murmur, rub or gallop   Abdomen:  Soft, non-tender, bowel sounds active all four quadrants,   no masses, no organomegaly   Rectal:        Extremities: Extremities normal, atraumatic, no cyanosis or edema   Pulses: 2+ and symmetric all extremities   Skin: Skin color, texture, turgor normal, no rashes or lesions   Lymph nodes: Cervical, supraclavicular, and axillary nodes normal   Neurologic: CNII-XII intact. Normal strength, sensation and reflexes   throughout          Results for orders placed or performed in visit on 08/11/21   Lipid Panel    Specimen: Blood   Result Value Ref Range    Total Cholesterol 145 0 - 200 mg/dL    Triglycerides 71 0 - 150 mg/dL    HDL Cholesterol 64 (H) 40 - 60 mg/dL    VLDL Cholesterol Pon[place] 14 5 - 40 mg/dL    LDL Chol Calc (NIH) 67 0 - 100 mg/dL   Basic Metabolic Panel    Specimen: Blood   Result Value Ref Range    Glucose 83 65 - 99 mg/dL    BUN 10 8 - 23 mg/dL    Creatinine 1.29 (H) 0.76 - 1.27 mg/dL    eGFR Non African Am 57 (L) >60 mL/min/1.73    eGFR African Am 69 >60 mL/min/1.73    BUN/Creatinine Ratio 7.8 7.0 - 25.0    Sodium 140 136 - 145 mmol/L    Potassium 4.2 3.5 - 5.2 mmol/L    Chloride 105 98 - 107 mmol/L    Total CO2 25.6 22.0 - 29.0 mmol/L    Calcium 9.4 8.6 - 10.5 mg/dL   ALT    Specimen: Blood   Result Value Ref Range    ALT (SGPT) 41 1 - 41 U/L   CBC (No Diff)    Specimen: Blood   Result Value Ref Range    WBC 5.45 3.40 - 10.80 10*3/mm3    RBC 5.03 4.14 - 5.80 10*6/mm3    Hemoglobin 14.0 13.0 - 17.7 g/dL    Hematocrit 42.4 37.5 - 51.0 %     MCV 84.3 79.0 - 97.0 fL    MCH 27.8 26.6 - 33.0 pg    MCHC 33.0 31.5 - 35.7 g/dL    RDW 13.5 12.3 - 15.4 %    Platelets 123 (L) 140 - 450 10*3/mm3   TSH    Specimen: Blood   Result Value Ref Range    TSH 1.620 0.270 - 4.200 uIU/mL   PSA Screen    Specimen: Blood   Result Value Ref Range    PSA 1.580 0.000 - 4.000 ng/mL   Urinalysis With Microscopic If Indicated (No Culture) - Urine, Clean Catch    Specimen: Urine, Clean Catch   Result Value Ref Range    Specific Gravity, UA 1.010 1.005 - 1.030    pH, UA 6.5 5.0 - 8.0    Color, UA Yellow     Appearance, UA Clear Clear    Leukocytes, UA Negative Negative    Protein Negative Negative    Glucose, UA Negative Negative    Ketones Negative Negative    Blood, UA Negative Negative    Bilirubin, UA Negative Negative    Urobilinogen, UA Comment     Nitrite, UA Negative Negative     Assessment/Plan   Healthy male exam.  Diagnoses and all orders for this visit:    1. Routine adult health maintenance (Primary)    2. Rheumatoid arthritis involving multiple sites with positive rheumatoid factor (CMS/[facility])  -     celecoxib (CeleBREX) 200 MG capsule; Take 1 capsule by mouth Daily. Indications: Rheumatoid Arthritis  Dispense: 90 capsule; Refill: 3    3. Combined arterial insufficiency and corporo-venous occlusive erectile dysfunction  -     sildenafil (REVATIO) 20 MG tablet; Take 1 tablet by mouth Daily As Needed (ED).  Dispense: 30 tablet; Refill: 5    Other orders  -     Zoster Vac Recomb Adjuvanted (Shingrix) 50 MCG/0.5ML reconstituted suspension; Inject 0.5 mL into the appropriate muscle as directed by prescriber Every 6 (Six) Months for 2 doses.  Dispense: 1 each; Refill: 1      1. Labs ok  2. Patient Counseling:  --Nutrition: Stressed importance of moderation in sodium/caffeine intake, saturated fat and cholesterol.  Discussed caloric balance, sufficient intake of fresh fruits, vegetables, fiber, calcium, iron.  --Exercise: Stressed the importance of regular exercise.    --Substance Abuse: Discussed cessation/primary prevention of tobacco, alcohol, or other drug use; driving or other dangerous activities under the influence.    --Dental health: Discussed importance of regular tooth brushing, flossing, and dental visits.  --Suggested having eyes and vision checked if needed or past due.  --Immunizations reviewed and given if needed.  --Discussed benefits of screening colonoscopy and or [email or web address]  3. Discussed the patient's BMI with him.  The BMI is above average; BMI management plan is completed  4. Follow up in one year    There are no Patient Instructions on file for this visit.    Medications Discontinued During This Encounter   Medication Reason   • celecoxib (CeleBREX) 200 MG capsule Reorder   • sildenafil (REVATIO) 20 MG tablet Reorder        Dr. Juan Fernandez MD  [place], [place]  [facility]

## 2022-01-05 ENCOUNTER — TELEPHONE (OUTPATIENT)
Dept: FAMILY MEDICINE CLINIC | Facility: CLINIC | Age: 62
End: 2022-01-05

## 2022-01-05 NOTE — TELEPHONE ENCOUNTER
Spoke with patient his only symptom has been chills/hot flash last night. He said he tested negative for covid, I advised to wait a few more days to see if any symptoms start to develop if so to call and make appointment.

## 2022-01-05 NOTE — TELEPHONE ENCOUNTER
Caller: Marysol Rollins    Relationship: Self    Best call back number: [phone]    What is the best time to reach you: ANYTIME    Who are you requesting to speak with (clinical staff, provider,  specific staff member): DR. PIEDRA'S NURSE    Do you know the name of the person who called: PATIENT     What was the call regarding: HE IS HAVING SOME CHILLS, BUT HAS TESTED NEG FOR COVID    Do you require a callback: YES

## 2022-01-19 ENCOUNTER — OFFICE VISIT (OUTPATIENT)
Dept: CARDIOLOGY | Facility: CLINIC | Age: 62
End: 2022-01-19

## 2022-01-19 VITALS
BODY MASS INDEX: 32.67 KG/M2 | RESPIRATION RATE: 18 BRPM | SYSTOLIC BLOOD PRESSURE: 120 MMHG | WEIGHT: 228.2 LBS | HEART RATE: 68 BPM | DIASTOLIC BLOOD PRESSURE: 84 MMHG | HEIGHT: 70 IN

## 2022-01-19 DIAGNOSIS — N52.03 COMBINED ARTERIAL INSUFFICIENCY AND CORPORO-VENOUS OCCLUSIVE ERECTILE DYSFUNCTION: ICD-10-CM

## 2022-01-19 DIAGNOSIS — I21.4 ACUTE NON-ST ELEVATION MYOCARDIAL INFARCTION (NSTEMI): ICD-10-CM

## 2022-01-19 DIAGNOSIS — I25.119 CORONARY ARTERY DISEASE INVOLVING NATIVE CORONARY ARTERY OF NATIVE HEART WITH ANGINA PECTORIS: ICD-10-CM

## 2022-01-19 DIAGNOSIS — E78.2 MIXED HYPERLIPIDEMIA: Primary | ICD-10-CM

## 2022-01-19 PROCEDURE — 99214 OFFICE O/P EST MOD 30 MIN: CPT | Performed by: INTERNAL MEDICINE

## 2022-01-19 PROCEDURE — 93000 ELECTROCARDIOGRAM COMPLETE: CPT | Performed by: INTERNAL MEDICINE

## 2022-01-19 RX ORDER — CLOPIDOGREL BISULFATE 75 MG/1
75 TABLET ORAL DAILY
Qty: 90 TABLET | Refills: 3 | Status: SHIPPED | OUTPATIENT
Start: 2022-01-19 | End: 2023-01-23 | Stop reason: SDUPTHER

## 2022-01-19 RX ORDER — ATORVASTATIN CALCIUM 40 MG/1
40 TABLET, FILM COATED ORAL DAILY
Qty: 90 TABLET | Refills: 3 | Status: SHIPPED | OUTPATIENT
Start: 2022-01-19 | End: 2023-01-23 | Stop reason: SDUPTHER

## 2022-01-19 RX ORDER — ASPIRIN 81 MG/1
81 TABLET ORAL DAILY
Qty: 90 TABLET | Refills: 3 | Status: SHIPPED | OUTPATIENT
Start: 2022-01-19 | End: 2023-02-13 | Stop reason: SDUPTHER

## 2022-01-19 RX ORDER — DOXAZOSIN MESYLATE 4 MG/1
4 TABLET ORAL NIGHTLY
COMMUNITY

## 2022-01-31 NOTE — PROGRESS NOTES
Cardiology Clinic Note  Bill Oseguera MD, PhD    Subjective:     Encounter Date:01/19/2022      Patient ID: Marysol Rollins is a 61 y.o. male.    Chief Complaint:  Chief Complaint   Patient presents with   • Coronary Artery Disease       HPI:    I the pleasure to see this 61-year-old gentleman is a new patient today with history of coronary artery disease.  He had a stent back in 2020, he has hypertension hyperlipidemia and history of MI, is here to establish care, blood pressure 120/84 heart rates in the 60s.  He denies any new onset angina, he is on aspirin statin Plavix but no beta-blockers given heart rates in the 60s and normal blood pressures.  He has a history of persistent rheumatoid arthritis taking Celebrex chronically.  Last 2D echo 2020 revealed an EF of 55% with normal regional and global wall motion, normal RV, normal atrial sizes, no significant valvular abnormality other than trace to mild regurgitation, structurally normal aortic valve, mild aortic root dilatation, cardiac cath report reviewed from March 2020 revealed obstructive disease to the RCA with NSTEMI at that time, overlapping drug-eluting stents were placed to the mid back to proximal RCA, there is residual left-sided disease 60 to 70% but very small caliber distal vessels not optimal for percutaneous intervention that was not Manuel Garcia II was treated medically.  Circumflex was angiographically normal, LAD 60 to 70% very small caliber, diagonal branch small caliber 60% as well, 95% mid RCA with thrombus present that was stented accordingly, stents for [place] 2.25 x 22 and 2.5 x 18.    Historical data copied forward from previous encounters in EMR including the history, exam, and assessment/plan has been reviewed and is unchanged unless noted otherwise.    Cardiac medicines reviewed with risk, benefits, and necessity of each discussed.    Risk and benefit of cardiac testing reviewed including death heart attack stroke pain bleeding infection  "need for vascular /cardiovascular surgery were discussed and the patient     Objective:         /84 (BP Location: Left arm, Patient Position: Sitting)   Pulse 68   Resp 18   Ht 177.8 cm (70\")   Wt 104 kg (228 lb 3.2 oz)   BMI 32.74 kg/m²     Physical Exam  Regular rate and rhythm no rubs gallops heaves lift  No clubbing cyanosis or edema  Clear to auscultation  Normal peripheral pulses  Assessment:         History of MI 2020  Coronary artery disease, borderline obstructive LAD and small caliber 60 to 70% treated medically, RCA with Ramón drug-eluting stents March 2020  Continue aspirin Plavix  High intensity statin per guidelines  Not on beta-blocker with normal EF and no chest pain and preserved EF and controlled blood pressure    Essential hypertension controlled  Hyperlipidemia continue statin therapy and aspirin and Plavix    We will leave him on dual any platelet therapy along with statin therapy after 3 years per guidelines as well as with residual LAD disease that is small caliber with low bleeding risk    Return to clinic in 1 year, refill all medicines  Back to clinic if he has any questions or concerns or new symptoms      The pleasure to be involved in this patient's cardiovascular care.  Please call with any questions or concerns  Bill Oseguera MD, PhD    Most recent EKG as reviewed and interpreted by me:    ECG 12 Lead    Date/Time: 1/18/2022 10:38 AM  Performed by: Bill Oseguera MD  Authorized by: Bill Oseguera MD   Comparison: not compared with previous ECG   Rhythm: sinus tachycardia  Rate: normal  Q waves: III and aVF    QRS axis: normal  Other findings: non-specific ST-T wave changes    Clinical impression: abnormal EKG             Most recent echo as reviewed and interpreted by me:      Most recent stress test as reviewed and interpreted by me:      Most recent cardiac catheterization as reviewed interpreted by me:  No results found for this or any previous " visit.    The following portions of the patient's history were reviewed and updated as appropriate: allergies, current medications, past family history, past medical history, past social history, past surgical history and problem list.      ROS:  14 point review of systems negative except as mentioned above    Current Outpatient Medications:   •  atorvastatin (LIPITOR) 40 MG tablet, Take 1 tablet by mouth Daily., Disp: 90 tablet, Rfl: 3  •  celecoxib (CeleBREX) 200 MG capsule, Take 1 capsule by mouth Daily. Indications: Rheumatoid Arthritis (Patient taking differently: Take 200 mg by mouth As Needed. Indications: Rheumatoid Arthritis), Disp: 90 capsule, Rfl: 3  •  cholecalciferol (VITAMIN D3) 1000 units tablet, Take 1,000 Units by mouth Daily., Disp: , Rfl:   •  clopidogrel (PLAVIX) 75 MG tablet, Take 1 tablet by mouth Daily., Disp: 90 tablet, Rfl: 3  •  doxazosin (CARDURA) 4 MG tablet, Take 4 mg by mouth Every Night., Disp: , Rfl:   •  hydroxychloroquine (PLAQUENIL) 200 MG tablet, Take 1 tablet by mouth 2 (Two) Times a Day., Disp: 180 tablet, Rfl: 1  •  nitroglycerin (NITROSTAT) 0.4 MG SL tablet, Place 0.4 mg under the tongue Every 5 (Five) Minutes As Needed. Take no more than 3 doses in 15 minutes., Disp: , Rfl:   •  Saw Palmetto, Serenoa repens, (SAW PALMETTO PO), Take  by mouth., Disp: , Rfl:   •  sildenafil (REVATIO) 20 MG tablet, Take 1 tablet by mouth Daily As Needed (ED)., Disp: 30 tablet, Rfl: 5  •  vitamin B-6 (PYRIDOXINE) 50 MG tablet, Take  by mouth Daily., Disp: , Rfl:   •  aspirin (ASPIR) 81 MG EC tablet, Take 1 tablet by mouth Daily., Disp: 90 tablet, Rfl: 3  •  Zoster Vac Recomb Adjuvanted (Shingrix) 50 MCG/0.5ML reconstituted suspension, Inject 0.5 mL into the appropriate muscle as directed by prescriber Every 6 (Six) Months for 2 doses., Disp: 1 each, Rfl: 1    Problem List:  Patient Active Problem List   Diagnosis   • Benign fibroma of prostate   • HLD (hyperlipidemia)   • Intractable episodic  cluster headache   • Routine adult health maintenance   • Screening for colon cancer   • Screening for prostate cancer   • Medication monitoring encounter   • Combined arterial insufficiency and corporo-venous occlusive erectile dysfunction   • Thrombocytopenia (HCC)   • Rheumatoid arthritis with rheumatoid factor of multiple sites without organ or systems involvement (HCC)   • Coronary artery disease involving native coronary artery of native heart with angina pectoris (HCC)   • Right hydrocele   • Acute myocardial infarction (HCC)     Past Medical History:  No past medical history on file.  Past Surgical History:  Past Surgical History:   Procedure Laterality Date   • COLONOSCOPY  2010    Repeat 10 yr   • COLONOSCOPY W/ BIOPSIES  03/29/2021    Dr KIRA Angulo, no polys.     Social History:  Social History     Socioeconomic History   • Marital status:    • Number of children: 1   • Years of education: Masters   Tobacco Use   • Smoking status: Never Smoker   • Smokeless tobacco: Never Used   Vaping Use   • Vaping Use: Never used   Substance and Sexual Activity   • Alcohol use: Yes     Comment: 1-2 every other day   • Drug use: No   • Sexual activity: Yes     Partners: Female     Birth control/protection: Post-menopausal     Allergies:  No Known Allergies  Immunizations:  Immunization History   Administered Date(s) Administered   • COVID-19 (MODERNA) 1st, 2nd, 3rd Dose Only 02/05/2021, 03/05/2021   • Flu Vaccine Split Quad 09/04/2020   • flucelvax quad pfs =>4 YRS 01/15/2019            In-Office Procedure(s):  No orders to display        ASCVD RIsk Score::  The 10-year ASCVD risk score (Amari BENOIT Jr., et al., 2013) is: 6.7%    Values used to calculate the score:      Age: 61 years      Sex: Male      Is Non- : Yes      Diabetic: No      Tobacco smoker: No      Systolic Blood Pressure: 120 mmHg      Is BP treated: No      HDL Cholesterol: 64 mg/dL      Total Cholesterol: 145  mg/dL    Imaging:    Results for orders placed in visit on 09/15/21    SCANNED - IMAGING               Lab Review:   Results Encounter on 08/11/2021   Component Date Value   • Total Cholesterol 08/11/2021 145    • Triglycerides 08/11/2021 71    • HDL Cholesterol 08/11/2021 64*   • VLDL Cholesterol Poncho 08/11/2021 14    • LDL Chol Calc ([facility]) 08/11/2021 67    • Glucose 08/11/2021 83    • BUN 08/11/2021 10    • Creatinine 08/11/2021 1.29*   • eGFR Non African Am 08/11/2021 57*   • eGFR  Am 08/11/2021 69    • BUN/Creatinine Ratio 08/11/2021 7.8    • Sodium 08/11/2021 140    • Potassium 08/11/2021 4.2    • Chloride 08/11/2021 105    • Total CO2 08/11/2021 25.6    • Calcium 08/11/2021 9.4    • ALT (SGPT) 08/11/2021 41    • WBC 08/11/2021 5.45    • RBC 08/11/2021 5.03    • Hemoglobin 08/11/2021 14.0    • Hematocrit 08/11/2021 42.4    • MCV 08/11/2021 84.3    • MCH 08/11/2021 27.8    • MCHC 08/11/2021 33.0    • RDW 08/11/2021 13.5    • Platelets 08/11/2021 123*   • TSH 08/11/2021 1.620    • PSA 08/11/2021 1.580    • Specific [place], UA 08/11/2021 1.010    • pH, UA 08/11/2021 6.5    • Color, UA 08/11/2021 Yellow    • Appearance, UA 08/11/2021 Clear    • Leukocytes, UA 08/11/2021 Negative    • Protein 08/11/2021 Negative    • Glucose, UA 08/11/2021 Negative    • Ketones 08/11/2021 Negative    • Blood, UA 08/11/2021 Negative    • Bilirubin, UA 08/11/2021 Negative    • Urobilinogen, UA 08/11/2021 Comment    • Nitrite, UA 08/11/2021 Negative      Recent labs reviewed and interpreted for clinical significance and application            Level of Care:           Bill Oseguera MD  01/31/22  .

## 2022-08-23 ENCOUNTER — TELEPHONE (OUTPATIENT)
Dept: FAMILY MEDICINE CLINIC | Facility: CLINIC | Age: 62
End: 2022-08-23

## 2022-08-23 ENCOUNTER — OFFICE VISIT (OUTPATIENT)
Dept: FAMILY MEDICINE CLINIC | Facility: CLINIC | Age: 62
End: 2022-08-23

## 2022-08-23 VITALS
TEMPERATURE: 97.3 F | HEART RATE: 58 BPM | DIASTOLIC BLOOD PRESSURE: 80 MMHG | BODY MASS INDEX: 32.64 KG/M2 | OXYGEN SATURATION: 99 % | HEIGHT: 70 IN | WEIGHT: 228 LBS | SYSTOLIC BLOOD PRESSURE: 128 MMHG

## 2022-08-23 DIAGNOSIS — E78.2 MIXED HYPERLIPIDEMIA: ICD-10-CM

## 2022-08-23 DIAGNOSIS — Z12.5 SCREENING FOR PROSTATE CANCER: ICD-10-CM

## 2022-08-23 DIAGNOSIS — N52.03 COMBINED ARTERIAL INSUFFICIENCY AND CORPORO-VENOUS OCCLUSIVE ERECTILE DYSFUNCTION: ICD-10-CM

## 2022-08-23 DIAGNOSIS — M05.79 RHEUMATOID ARTHRITIS INVOLVING MULTIPLE SITES WITH POSITIVE RHEUMATOID FACTOR: ICD-10-CM

## 2022-08-23 DIAGNOSIS — Z00.00 ROUTINE ADULT HEALTH MAINTENANCE: Primary | ICD-10-CM

## 2022-08-23 DIAGNOSIS — R13.19 ESOPHAGEAL DYSPHAGIA: ICD-10-CM

## 2022-08-23 DIAGNOSIS — M05.79 RHEUMATOID ARTHRITIS WITH RHEUMATOID FACTOR OF MULTIPLE SITES WITHOUT ORGAN OR SYSTEMS INVOLVEMENT: ICD-10-CM

## 2022-08-23 PROCEDURE — 99396 PREV VISIT EST AGE 40-64: CPT | Performed by: FAMILY MEDICINE

## 2022-08-23 RX ORDER — ZOSTER VACCINE RECOMBINANT, ADJUVANTED 50 MCG/0.5
50 KIT INTRAMUSCULAR
Qty: 1 EACH | Refills: 1 | Status: SHIPPED | OUTPATIENT
Start: 2022-08-23 | End: 2023-02-20

## 2022-08-23 RX ORDER — SILDENAFIL CITRATE 20 MG/1
20 TABLET ORAL DAILY PRN
Qty: 30 TABLET | Refills: 5 | Status: SHIPPED | OUTPATIENT
Start: 2022-08-23

## 2022-08-23 RX ORDER — MULTIPLE VITAMINS W/ MINERALS TAB 9MG-400MCG
1 TAB ORAL DAILY
COMMUNITY

## 2022-08-23 RX ORDER — CELECOXIB 200 MG/1
200 CAPSULE ORAL AS NEEDED
Qty: 90 CAPSULE | Refills: 1 | Status: SHIPPED | OUTPATIENT
Start: 2022-08-23

## 2022-08-23 RX ORDER — OMEPRAZOLE 40 MG/1
40 CAPSULE, DELAYED RELEASE ORAL DAILY
Qty: 90 CAPSULE | Refills: 0 | Status: SHIPPED | OUTPATIENT
Start: 2022-08-23 | End: 2023-02-13

## 2022-08-23 NOTE — TELEPHONE ENCOUNTER
Pharmacy Name:  [facility]    Pharmacy representative name: MADDY    Pharmacy representative phone number: [phone]    What medication are you calling in regards to: celecoxib (CeleBREX) 200 MG capsule    What question does the pharmacy have: NEED A MAX DAILY DOSE LISTED    Who is the provider that prescribed the medication:DOROTHEA

## 2022-08-23 NOTE — PROGRESS NOTES
"  Chief Complaint   Patient presents with   • Annual Exam       Subjective   Marysol Rollins is a 61 y.o. male and is here for a yearly physical exam. The patient reports problems - Having dysphagia with solids.  Food is getting caught in the lower esophagus.'s been going on for several months..    Do you take any herbs or supplements that were not prescribed by a doctor? yes. If so, these will be added to active medication list.    The following portions of the patient's history were reviewed and updated as appropriate: allergies, current medications, past family history, past medical history, past social history, past surgical history and problem list.    Social and Family and Surgical History reviewed and updated today, see Rooming tab.    Health History, Preventive Measures and Vaccination flow sheets reviewed and updated today.    Patient's current medical chart in Epic; including previous office notes, Mychart messages, recent phone calls, imaging, labs, specialist's evaluation either in notes or in Media tab reviewed today.    Other outside pertinent medical information also reviewed thru Care Everywhere function is also reviewed today.    Review of Systems  Review of Systems  Pertinent items are noted in HPI.    Vitals:    08/23/22 0857   BP: 128/80   BP Location: Left arm   Patient Position: Sitting   Cuff Size: Large Adult   Pulse: 58   Temp: 97.3 °F (36.3 °C)   SpO2: 99%   Weight: 103 kg (228 lb)   Height: 177.8 cm (70\")     Body mass index is 32.71 kg/m².      General Appearance:  Alert, cooperative, no distress, appears stated age   Head:  Normocephalic, without obvious abnormality, atraumatic   Eyes:  PERRL, conjunctiva/corneas clear, EOM's intact.   Ears:  Normal TM's and external ear canals, both ears   Nose: Nares normal, septum midline, mucosa normal, no drainage or sinus tenderness   Throat: Lips, mucosa, and tongue normal; teeth and gums normal   Neck: Supple, symmetrical, trachea midline, no " adenopathy;   thyroid: No enlargement/tenderness/nodules; no carotid  bruit   Back:  Symmetric, no curvature, ROM normal, no CVA tenderness   Lungs:  Clear to auscultation bilaterally, respirations unlabored   Chest wall:  No tenderness or deformity   Heart:  Regular rate and rhythm, S1 and S2 normal, no murmur, rub or gallop   Abdomen:  Soft, non-tender, bowel sounds active all four quadrants,   no masses, no organomegaly   Rectal:        Extremities: Extremities normal, atraumatic, no cyanosis or edema   Pulses: 2+ and symmetric all extremities   Skin: Skin color, texture, turgor normal, no rashes or lesions   Lymph nodes: Cervical, supraclavicular, and axillary nodes normal   Neurologic: CNII-XII intact. Normal strength, sensation and reflexes   throughout          Results for orders placed or performed in visit on 08/11/21   Lipid Panel    Specimen: Blood   Result Value Ref Range    Total Cholesterol 145 0 - 200 mg/dL    Triglycerides 71 0 - 150 mg/dL    HDL Cholesterol 64 (H) 40 - 60 mg/dL    VLDL Cholesterol Pon[place] 14 5 - 40 mg/dL    LDL Chol Calc (NIH) 67 0 - 100 mg/dL   Basic Metabolic Panel    Specimen: Blood   Result Value Ref Range    Glucose 83 65 - 99 mg/dL    BUN 10 8 - 23 mg/dL    Creatinine 1.29 (H) 0.76 - 1.27 mg/dL    eGFR Non African Am 57 (L) >60 mL/min/1.73    eGFR African Am 69 >60 mL/min/1.73    BUN/Creatinine Ratio 7.8 7.0 - 25.0    Sodium 140 136 - 145 mmol/L    Potassium 4.2 3.5 - 5.2 mmol/L    Chloride 105 98 - 107 mmol/L    Total CO2 25.6 22.0 - 29.0 mmol/L    Calcium 9.4 8.6 - 10.5 mg/dL   ALT    Specimen: Blood   Result Value Ref Range    ALT (SGPT) 41 1 - 41 U/L   CBC (No Diff)    Specimen: Blood   Result Value Ref Range    WBC 5.45 3.40 - 10.80 10*3/mm3    RBC 5.03 4.14 - 5.80 10*6/mm3    Hemoglobin 14.0 13.0 - 17.7 g/dL    Hematocrit 42.4 37.5 - 51.0 %    MCV 84.3 79.0 - 97.0 fL    MCH 27.8 26.6 - 33.0 pg    MCHC 33.0 31.5 - 35.7 g/dL    RDW 13.5 12.3 - 15.4 %    Platelets 123 (L) 140 -  450 10*3/mm3   TSH    Specimen: Blood   Result Value Ref Range    TSH 1.620 0.270 - 4.200 uIU/mL   PSA Screen    Specimen: Blood   Result Value Ref Range    PSA 1.580 0.000 - 4.000 ng/mL   Urinalysis With Microscopic If Indicated (No Culture) - Urine, Clean Catch    Specimen: Urine, Clean Catch   Result Value Ref Range    Specific Gravity, UA 1.010 1.005 - 1.030    pH, UA 6.5 5.0 - 8.0    Color, UA Yellow     Appearance, UA Clear Clear    Leukocytes, UA Negative Negative    Protein Negative Negative    Glucose, UA Negative Negative    Ketones Negative Negative    Blood, UA Negative Negative    Bilirubin, UA Negative Negative    Urobilinogen, UA Comment     Nitrite, UA Negative Negative     Assessment & Plan   Healthy male exam.  Diagnoses and all orders for this visit:    1. Routine adult health maintenance (Primary)  -     Zoster Vac Recomb Adjuvanted (Shingrix) 50 MCG/0.5ML reconstituted suspension; Inject 0.5 mL into the appropriate muscle as directed by prescriber Every 6 (Six) Months for 2 doses.  Dispense: 1 each; Refill: 1    2. Screening for prostate cancer  -     PSA Screen    3. Esophageal dysphagia  -     FL upper gi water soluble; Future  -     omeprazole (priLOSEC) 40 MG capsule; Take 1 capsule by mouth Daily.  Dispense: 90 capsule; Refill: 0    4. Mixed hyperlipidemia  -     Lipid Panel    5. Rheumatoid arthritis with rheumatoid factor of multiple sites without organ or systems involvement (HCC)  -     CBC (No Diff)  -     Comprehensive Metabolic Panel  -     Lipid Panel  -     TSH  -     Urinalysis With Microscopic If Indicated (No Culture) - Urine, Clean Catch    6. Rheumatoid arthritis involving multiple sites with positive rheumatoid factor (HCC)  -     celecoxib (CeleBREX) 200 MG capsule; Take 1 capsule by mouth As Needed for Mild Pain . Indications: Rheumatoid Arthritis  Dispense: 90 capsule; Refill: 1    7. Combined arterial insufficiency and corporo-venous occlusive erectile dysfunction  -      sildenafil (REVATIO) 20 MG tablet; Take 1 tablet by mouth Daily As Needed (ED).  Dispense: 30 tablet; Refill: 5      1.  We will draw labs today.  He is current with rheumatology  He is current with urology  He is current with cardiology  For his dysphagia we will send him to [facility] for a upper GI barium swallow.  We will call with lab results.  2. Patient Counseling:  --Nutrition: Stressed importance of moderation in sodium/caffeine intake, saturated fat and cholesterol.  Discussed caloric balance, sufficient intake of fresh fruits, vegetables, fiber, calcium, iron.  --Exercise: Stressed the importance of regular exercise.   --Substance Abuse: Discussed cessation/primary prevention of tobacco, alcohol, or other drug use; driving or other dangerous activities under the influence.    --Dental health: Discussed importance of regular tooth brushing, flossing, and dental visits.  --Suggested having eyes and vision checked if needed or past due.  --Immunizations reviewed and given if needed.  --Discussed benefits of screening colonoscopy and or ColoGuard.  3. Discussed the patient's BMI with him.  The BMI is in the acceptable range  4. Follow up in 6 months    There are no Patient Instructions on file for this visit.    Medications Discontinued During This Encounter   Medication Reason   • vitamin B-6 (PYRIDOXINE) 50 MG tablet *Therapy completed   • celecoxib (CeleBREX) 200 MG capsule Reorder   • sildenafil (REVATIO) 20 MG tablet Reorder        Dr. Juan Fernandez MD  [place], [place]  [facility]

## 2022-08-24 LAB
ALBUMIN SERPL-MCNC: 4.3 G/DL (ref 3.8–4.8)
ALBUMIN/GLOB SERPL: 1.7 {RATIO} (ref 1.2–2.2)
ALP SERPL-CCNC: 44 IU/L (ref 44–121)
ALT SERPL-CCNC: 22 IU/L (ref 0–44)
APPEARANCE UR: CLEAR
AST SERPL-CCNC: 22 IU/L (ref 0–40)
BILIRUB SERPL-MCNC: 0.4 MG/DL (ref 0–1.2)
BILIRUB UR QL STRIP: NEGATIVE
BUN SERPL-MCNC: 10 MG/DL (ref 8–27)
BUN/CREAT SERPL: 8 (ref 10–24)
CALCIUM SERPL-MCNC: 9.7 MG/DL (ref 8.6–10.2)
CHLORIDE SERPL-SCNC: 103 MMOL/L (ref 96–106)
CHOLEST SERPL-MCNC: 139 MG/DL (ref 100–199)
CO2 SERPL-SCNC: 24 MMOL/L (ref 20–29)
COLOR UR: YELLOW
CREAT SERPL-MCNC: 1.26 MG/DL (ref 0.76–1.27)
EGFRCR-CYS SERPLBLD CKD-EPI 2021: 64 ML/MIN/1.73
ERYTHROCYTE [DISTWIDTH] IN BLOOD BY AUTOMATED COUNT: 13.3 % (ref 11.6–15.4)
GLOBULIN SER CALC-MCNC: 2.6 G/DL (ref 1.5–4.5)
GLUCOSE SERPL-MCNC: 82 MG/DL (ref 65–99)
GLUCOSE UR QL STRIP: NEGATIVE
HCT VFR BLD AUTO: 45.7 % (ref 37.5–51)
HDLC SERPL-MCNC: 63 MG/DL
HGB BLD-MCNC: 14.4 G/DL (ref 13–17.7)
HGB UR QL STRIP: NEGATIVE
KETONES UR QL STRIP: NEGATIVE
LDLC SERPL CALC-MCNC: 62 MG/DL (ref 0–99)
LEUKOCYTE ESTERASE UR QL STRIP: NEGATIVE
MCH RBC QN AUTO: 27.3 PG (ref 26.6–33)
MCHC RBC AUTO-ENTMCNC: 31.5 G/DL (ref 31.5–35.7)
MCV RBC AUTO: 87 FL (ref 79–97)
MICRO URNS: NORMAL
NITRITE UR QL STRIP: NEGATIVE
PH UR STRIP: 6 [PH] (ref 5–7.5)
PLATELET # BLD AUTO: 123 X10E3/UL (ref 150–450)
POTASSIUM SERPL-SCNC: 4.4 MMOL/L (ref 3.5–5.2)
PROT SERPL-MCNC: 6.9 G/DL (ref 6–8.5)
PROT UR QL STRIP: NEGATIVE
PSA SERPL-MCNC: 2.2 NG/ML (ref 0–4)
RBC # BLD AUTO: 5.28 X10E6/UL (ref 4.14–5.8)
SODIUM SERPL-SCNC: 139 MMOL/L (ref 134–144)
SP GR UR STRIP: 1.01 (ref 1–1.03)
TRIGL SERPL-MCNC: 71 MG/DL (ref 0–149)
TSH SERPL DL<=0.005 MIU/L-ACNC: 1.98 UIU/ML (ref 0.45–4.5)
UROBILINOGEN UR STRIP-MCNC: 0.2 MG/DL (ref 0.2–1)
VLDLC SERPL CALC-MCNC: 14 MG/DL (ref 5–40)
WBC # BLD AUTO: 5.4 X10E3/UL (ref 3.4–10.8)

## 2022-08-31 ENCOUNTER — TELEPHONE (OUTPATIENT)
Dept: FAMILY MEDICINE CLINIC | Facility: CLINIC | Age: 62
End: 2022-08-31

## 2022-08-31 NOTE — TELEPHONE ENCOUNTER
Caller: Marysol Rollins    Relationship to patient: Self    Best call back number: [phone]    Patient is needing: PATIENT WOULD LIKE SOMEONE FROM THE OFFICE TO CALL HIM AND DISCUSS HIS TEST RESULTS.

## 2022-09-16 ENCOUNTER — HOSPITAL ENCOUNTER (OUTPATIENT)
Dept: GENERAL RADIOLOGY | Facility: HOSPITAL | Age: 62
Discharge: HOME | End: 2022-09-16
Admitting: FAMILY MEDICINE

## 2022-09-16 DIAGNOSIS — K22.2 ESOPHAGEAL STRICTURE: Primary | ICD-10-CM

## 2022-09-16 DIAGNOSIS — R13.19 ESOPHAGEAL DYSPHAGIA: ICD-10-CM

## 2022-09-16 PROCEDURE — 74240 X-RAY XM UPR GI TRC 1CNTRST: CPT

## 2022-09-16 PROCEDURE — 0 IOPAMIDOL PER 1 ML: Performed by: FAMILY MEDICINE

## 2022-09-16 RX ADMIN — IOPAMIDOL 100 ML: 755 INJECTION, SOLUTION INTRAVENOUS at 08:56

## 2022-09-16 RX ADMIN — IOPAMIDOL 100 ML: 755 INJECTION, SOLUTION INTRAVENOUS at 08:55

## 2023-01-03 ENCOUNTER — OFFICE VISIT (OUTPATIENT)
Dept: FAMILY MEDICINE CLINIC | Facility: CLINIC | Age: 63
End: 2023-01-03
Payer: COMMERCIAL

## 2023-01-03 VITALS
DIASTOLIC BLOOD PRESSURE: 80 MMHG | HEART RATE: 65 BPM | SYSTOLIC BLOOD PRESSURE: 126 MMHG | OXYGEN SATURATION: 99 % | WEIGHT: 228 LBS | TEMPERATURE: 97.3 F | BODY MASS INDEX: 32.64 KG/M2 | HEIGHT: 70 IN

## 2023-01-03 DIAGNOSIS — K64.8 INTERNAL HEMORRHOIDS: Primary | ICD-10-CM

## 2023-01-03 DIAGNOSIS — K60.2 ANAL FISSURE: ICD-10-CM

## 2023-01-03 PROCEDURE — 99213 OFFICE O/P EST LOW 20 MIN: CPT | Performed by: FAMILY MEDICINE

## 2023-01-03 RX ORDER — DIAPER,BRIEF,INFANT-TODD,DISP
1 EACH MISCELLANEOUS 2 TIMES DAILY
Qty: 1 EACH | Refills: 0
Start: 2023-01-03 | End: 2023-02-13

## 2023-01-03 NOTE — PROGRESS NOTES
Subjective   Marysol Rollins is a 62 y.o. male who is here for   Chief Complaint   Patient presents with   • Rectal Bleeding     Noticed Sunday / was having constipation prior to that, took stool softener last night    .   Answers for HPI/ROS submitted by the patient on 1/3/2023  What is the primary reason for your visit?: Other  Please describe your symptoms.: Blood in stool.  Have you had these symptoms before?: Yes  How long have you been having these symptoms?: 1-4 days  Please list any medications you are currently taking for this condition.: None  Please describe any probable cause for these symptoms. : Hard Stool      Rectal Bleeding  This is a recurrent problem. The current episode started yesterday. The problem occurs intermittently. The problem has been rapidly improving. Pertinent negatives include no abdominal pain.    h/o anal fissure and internal hemorrhoids  Toilet paper had red blood on it and water was pink, x 1.  todays BM normal    The following portions of the patient's history were reviewed and updated as appropriate: allergies, current medications, past family history, past medical history, past social history, past surgical history and problem list.    Review of Systems   Gastrointestinal: Positive for hematochezia. Negative for abdominal pain.       Objective   Vitals:    01/03/23 1523   BP: 126/80   BP Location: Left arm   Patient Position: Sitting   Cuff Size: Large Adult   Pulse: 65   Temp: 97.3 °F (36.3 °C)   SpO2: 99%   Weight: 103 kg (228 lb)   Height: 177.8 cm (70\")      Physical Exam  Genitourinary:     Rectum: Anal fissure present. No external hemorrhoid.             Assessment & Plan   Diagnoses and all orders for this visit:    1. Internal hemorrhoids (Primary)  -     hydrocortisone (Preparation H) 1 % cream; Apply 1 application topically to the appropriate area as directed 2 (Two) Times a Day.  Dispense: 1 each; Refill: 0    2. Anal fissure  -     hydrocortisone (Preparation H)  1 % cream; Apply 1 application topically to the appropriate area as directed 2 (Two) Times a Day.  Dispense: 1 each; Refill: 0      There are no Patient Instructions on file for this visit.    There are no discontinued medications.     No follow-ups on file.    Dr. Juan Fernandez  [place], [place]

## 2023-01-18 ENCOUNTER — TELEPHONE (OUTPATIENT)
Dept: FAMILY MEDICINE CLINIC | Facility: CLINIC | Age: 63
End: 2023-01-18
Payer: COMMERCIAL

## 2023-01-18 ENCOUNTER — OFFICE (OUTPATIENT)
Dept: URBAN - METROPOLITAN AREA CLINIC 64 | Facility: CLINIC | Age: 63
End: 2023-01-18

## 2023-01-18 VITALS
WEIGHT: 233 LBS | HEART RATE: 71 BPM | SYSTOLIC BLOOD PRESSURE: 133 MMHG | HEIGHT: 70 IN | DIASTOLIC BLOOD PRESSURE: 79 MMHG

## 2023-01-18 DIAGNOSIS — I25.10 ATHEROSCLEROTIC HEART DISEASE OF NATIVE CORONARY ARTERY WITH: ICD-10-CM

## 2023-01-18 DIAGNOSIS — R13.10 DYSPHAGIA, UNSPECIFIED: ICD-10-CM

## 2023-01-18 DIAGNOSIS — K30 FUNCTIONAL DYSPEPSIA: ICD-10-CM

## 2023-01-18 PROCEDURE — 99204 OFFICE O/P NEW MOD 45 MIN: CPT

## 2023-01-18 NOTE — TELEPHONE ENCOUNTER
Pt called regarding referral to  who he has seen in the past. He said he went for appointment today with GI but was told aaron is not part of there group. Are you able to send referral to Dr.Wayne Angulo for him to follow up on the upper GI he had done ?

## 2023-01-23 RX ORDER — CLOPIDOGREL BISULFATE 75 MG/1
75 TABLET ORAL DAILY
Qty: 90 TABLET | Refills: 0 | Status: SHIPPED | OUTPATIENT
Start: 2023-01-23 | End: 2023-02-13 | Stop reason: SDUPTHER

## 2023-01-23 RX ORDER — ATORVASTATIN CALCIUM 40 MG/1
40 TABLET, FILM COATED ORAL DAILY
Qty: 90 TABLET | Refills: 0 | Status: SHIPPED | OUTPATIENT
Start: 2023-01-23 | End: 2023-02-13 | Stop reason: SDUPTHER

## 2023-02-13 ENCOUNTER — OFFICE VISIT (OUTPATIENT)
Dept: CARDIOLOGY | Facility: CLINIC | Age: 63
End: 2023-02-13
Payer: COMMERCIAL

## 2023-02-13 VITALS
HEIGHT: 70 IN | BODY MASS INDEX: 32.93 KG/M2 | WEIGHT: 230 LBS | SYSTOLIC BLOOD PRESSURE: 132 MMHG | HEART RATE: 75 BPM | DIASTOLIC BLOOD PRESSURE: 78 MMHG | RESPIRATION RATE: 18 BRPM

## 2023-02-13 DIAGNOSIS — E78.2 MIXED HYPERLIPIDEMIA: Primary | ICD-10-CM

## 2023-02-13 DIAGNOSIS — N52.03 COMBINED ARTERIAL INSUFFICIENCY AND CORPORO-VENOUS OCCLUSIVE ERECTILE DYSFUNCTION: ICD-10-CM

## 2023-02-13 DIAGNOSIS — I25.119 CORONARY ARTERY DISEASE INVOLVING NATIVE CORONARY ARTERY OF NATIVE HEART WITH ANGINA PECTORIS: ICD-10-CM

## 2023-02-13 DIAGNOSIS — I21.4 ACUTE NON-ST ELEVATION MYOCARDIAL INFARCTION (NSTEMI): ICD-10-CM

## 2023-02-13 PROCEDURE — 99214 OFFICE O/P EST MOD 30 MIN: CPT | Performed by: INTERNAL MEDICINE

## 2023-02-13 PROCEDURE — 93000 ELECTROCARDIOGRAM COMPLETE: CPT | Performed by: INTERNAL MEDICINE

## 2023-02-13 RX ORDER — ASPIRIN 81 MG/1
81 TABLET ORAL DAILY
Qty: 90 TABLET | Refills: 3 | Status: SHIPPED | OUTPATIENT
Start: 2023-02-13

## 2023-02-13 RX ORDER — CLOPIDOGREL BISULFATE 75 MG/1
75 TABLET ORAL DAILY
Qty: 90 TABLET | Refills: 3 | Status: SHIPPED | OUTPATIENT
Start: 2023-02-13

## 2023-02-13 RX ORDER — NITROGLYCERIN 0.4 MG/1
0.4 TABLET SUBLINGUAL
Qty: 25 TABLET | Refills: 3 | Status: SHIPPED | OUTPATIENT
Start: 2023-02-13

## 2023-02-13 RX ORDER — ATORVASTATIN CALCIUM 40 MG/1
40 TABLET, FILM COATED ORAL DAILY
Qty: 90 TABLET | Refills: 3 | Status: SHIPPED | OUTPATIENT
Start: 2023-02-13

## 2023-02-13 NOTE — PROGRESS NOTES
Cardiology Clinic Note  Bill Oseguera MD, PhD    Subjective:     Encounter Date:02/13/2023      Patient ID: Marysol Rollins is a 62 y.o. male.    Chief Complaint:  Chief Complaint   Patient presents with   • Follow-up       HPI:       I the pleasure to see this 62-year-old gentleman is a new patient today with history of coronary artery disease.  He had a stent back in 2020, he has hypertension hyperlipidemia and history of MI, is here to establish care, blood pressure 120/84 heart rates in the 60s.  He denies any new onset angina, he is on aspirin statin Plavix but no beta-blockers given heart rates in the 60s and normal blood pressures.  He has a history of persistent rheumatoid arthritis taking Celebrex chronically.  Last 2D echo 2020 revealed an EF of 55% with normal regional and global wall motion, normal RV, normal atrial sizes, no significant valvular abnormality other than trace to mild regurgitation, structurally normal aortic valve, mild aortic root dilatation, cardiac cath report reviewed from March 2020 revealed obstructive disease to the RCA with NSTEMI at that time, overlapping drug-eluting stents were placed to the mid back to proximal RCA, there is residual left-sided disease 60 to 70% but very small caliber distal vessels not optimal for percutaneous intervention that was not [place] was treated medically.  Circumflex was angiographically normal, LAD 60 to 70% very small caliber, diagonal branch small caliber 60% as well, 95% mid RCA with thrombus present that was stented accordingly, stents for Ramón 2.25 x 22 and 2.5 x 18.    He presents back today otherwise doing well with no anginal, he has heartburn and is pending EGD, he is asking about his antiplatelets which can stop 5 days before with continuation of low-dose aspirin only with cessation of Plavix and he can restart this post procedurally as we discussed.  No unstable angina, heart failure signs or symptoms PND orthopnea palpitations  presyncope or other complaints today     Historical data copied forward from previous encounters in EMR including the history, exam, and assessment/plan has been reviewed and is unchanged unless noted otherwise.     Cardiac medicines reviewed with risk, benefits, and necessity of each discussed.     Risk and benefit of cardiac testing reviewed including death heart attack stroke pain bleeding infection need for vascular /cardiovascular surgery were discussed and the patient      Objective:         Objective          Vitals reviewed below  Physical Exam  Regular rate and rhythm no rubs gallops heaves lift  No clubbing cyanosis or edema  Clear to auscultation  Normal peripheral pulses  Normal CV exam  Assessment:         Assessment          History of MI 2020  Coronary artery disease, borderline obstructive LAD and small caliber 60 to 70% treated medically, RCA with [place] drug-eluting stents March 2020  Continue aspirin Plavix  High intensity statin per guidelines  Not on beta-blocker with normal EF and no chest pain and preserved EF and controlled blood pressure     Essential hypertension controlled  Hyperlipidemia continue statin therapy and aspirin and Plavix     We will leave him on dual any platelet therapy along with statin therapy after 3 years per guidelines as well as with residual LAD disease that is small caliber with low bleeding risk    Pending EGD  Can stop Plavix 5 days prior to procedure, continue low-dose aspirin  Restart Plavix post procedurally when able  No CV contraindication does not need ischemic evaluation prior to this procedure     Return to clinic in 1 year, refill all medicines  Back to clinic if he has any questions or concerns or new symptoms        The pleasure to be involved in this patient's cardiovascular care.  Please call with any questions or concerns  Bill Oseguera MD, PhD      Historical data copied forward from previous encounters in EMR including the history, exam, and  "assessment/plan has been reviewed and is unchanged unless noted otherwise.    Cardiac medicines reviewed with risk, benefits, and necessity of each discussed.    Risk and benefit of cardiac testing reviewed including death heart attack stroke pain bleeding infection need for vascular /cardiovascular surgery were discussed and the patient     Objective:         /78 (BP Location: Left arm, Patient Position: Sitting)   Pulse 75   Resp 18   Ht 177.8 cm (70\")   Wt 104 kg (230 lb)   BMI 33.00 kg/m²     Physical Exam    Assessment:         There are no diagnoses linked to this encounter.       Plan:              The pleasure to be involved in this patient's cardiovascular care.  Please call with any questions or concerns  Bill Oseguera MD, PhD    Most recent EKG as reviewed and interpreted by me:    ECG 12 Lead    Date/Time: 2/13/2023 4:49 PM  Performed by: Bill Oseguera MD  Authorized by: Bill Oseguera MD   Comparison: not compared with previous ECG   Previous ECG: no previous ECG available  Rhythm: sinus rhythm  Rate: normal  Conduction: conduction normal  Q waves: II, III and aVF    QRS axis: normal    Clinical impression: abnormal EKG  Comments: Old inferior MI             Most recent echo as reviewed and interpreted by me:      Most recent stress test as reviewed and interpreted by me:      Most recent cardiac catheterization as reviewed interpreted by me:  No results found for this or any previous visit.    The following portions of the patient's history were reviewed and updated as appropriate: allergies, current medications, past family history, past medical history, past social history, past surgical history and problem list.      ROS:  14 point review of systems negative except as mentioned above    Current Outpatient Medications:   •  aspirin 81 MG EC tablet, Take 1 tablet by mouth Daily., Disp: 90 tablet, Rfl: 3  •  atorvastatin (LIPITOR) 40 MG tablet, Take 1 tablet by mouth " Daily., Disp: 90 tablet, Rfl: 3  •  celecoxib (CeleBREX) 200 MG capsule, Take 1 capsule by mouth As Needed for Mild Pain . Indications: Rheumatoid Arthritis, Disp: 90 capsule, Rfl: 1  •  cholecalciferol (VITAMIN D3) 1000 units tablet, Take 1,000 Units by mouth Daily., Disp: , Rfl:   •  clopidogrel (PLAVIX) 75 MG tablet, Take 1 tablet by mouth Daily., Disp: 90 tablet, Rfl: 3  •  doxazosin (CARDURA) 4 MG tablet, Take 4 mg by mouth Every Night., Disp: , Rfl:   •  hydroxychloroquine (PLAQUENIL) 200 MG tablet, Take 1 tablet by mouth 2 (Two) Times a Day., Disp: 180 tablet, Rfl: 1  •  multivitamin with minerals tablet tablet, Take 1 tablet by mouth Daily., Disp: , Rfl:   •  nitroglycerin (NITROSTAT) 0.4 MG SL tablet, Place 1 tablet under the tongue Every 5 (Five) Minutes As Needed for Chest Pain. Take no more than 3 doses in 15 minutes., Disp: 25 tablet, Rfl: 3  •  Saw Palmetto, Serenoa repens, (SAW PALMETTO PO), Take  by mouth., Disp: , Rfl:   •  sildenafil (REVATIO) 20 MG tablet, Take 1 tablet by mouth Daily As Needed (ED)., Disp: 30 tablet, Rfl: 5  •  Zoster Vac Recomb Adjuvanted (Shingrix) 50 MCG/0.5ML reconstituted suspension, Inject 0.5 mL into the appropriate muscle as directed by prescriber Every 6 (Six) Months for 2 doses., Disp: 1 each, Rfl: 1    Problem List:  Patient Active Problem List   Diagnosis   • Benign fibroma of prostate   • HLD (hyperlipidemia)   • Intractable episodic cluster headache   • Routine adult health maintenance   • Screening for colon cancer   • Screening for prostate cancer   • Medication monitoring encounter   • Combined arterial insufficiency and corporo-venous occlusive erectile dysfunction   • Thrombocytopenia (HCC)   • Rheumatoid arthritis with rheumatoid factor of multiple sites without organ or systems involvement (HCC)   • Coronary artery disease involving native coronary artery of native heart with angina pectoris (HCC)   • Right hydrocele   • Acute myocardial infarction (HCC)   •  Esophageal dysphagia   • Internal hemorrhoids   • Anal fissure     Past Medical History:  Past Medical History:   Diagnosis Date   • Allergic     Seasonal   • Arthritis     Rheumtologist care     Past Surgical History:  Past Surgical History:   Procedure Laterality Date   • CARDIAC CATHETERIZATION  03/16/2020   • COLONOSCOPY  2010    Repeat 10 yr   • COLONOSCOPY W/ BIOPSIES  03/29/2021    Dr KIRA Angulo, no polys.     Social History:  Social History     Socioeconomic History   • Marital status:    • Number of children: 1   • Years of education: Masters   Tobacco Use   • Smoking status: Never   • Smokeless tobacco: Never   Vaping Use   • Vaping Use: Never used   Substance and Sexual Activity   • Alcohol use: Yes     Alcohol/week: 4.0 standard drinks     Types: 2 Cans of beer, 2 Shots of liquor per week     Comment: 1-2 every other day   • Drug use: No   • Sexual activity: Yes     Partners: Female     Birth control/protection: Post-menopausal     Allergies:  No Known Allergies  Immunizations:  Immunization History   Administered Date(s) Administered   • COVID-19 (MODERNA) 1st, 2nd, 3rd Dose Only 02/05/2021, 03/05/2021, 11/08/2021   • COVID-19 (MODERNA) BOOSTER 08/09/2022   • Flu Vaccine Split Quad 09/04/2020   • FluLaval/Fluzone >6mos 09/02/2021   • flucelvax quad pfs =>4 YRS 01/15/2019            In-Office Procedure(s):  No orders to display        ASCVD RIsk Score::  The ASCVD Risk score (Zeb DK, et al., 2019) failed to calculate for the following reasons:    The patient has a prior MI or stroke diagnosis    Imaging:    Results for orders placed in visit on 09/15/21    SCANNED - IMAGING               Lab Review:   Office Visit on 08/23/2022   Component Date Value   • WBC 08/23/2022 5.4    • RBC 08/23/2022 5.28    • Hemoglobin 08/23/2022 14.4    • Hematocrit 08/23/2022 45.7    • MCV 08/23/2022 87    • MCH 08/23/2022 27.3    • MCHC 08/23/2022 31.5    • RDW 08/23/2022 13.3    • Platelets 08/23/2022 123 (L)    •  Glucose 08/23/2022 82    • BUN 08/23/2022 10    • Creatinine 08/23/2022 1.26    • EGFR Result 08/23/2022 64    • BUN/Creatinine Ratio 08/23/2022 8 (L)    • Sodium 08/23/2022 139    • Potassium 08/23/2022 4.4    • Chloride 08/23/2022 103    • Total CO2 08/23/2022 24    • Calcium 08/23/2022 9.7    • Total Protein 08/23/2022 6.9    • Albumin 08/23/2022 4.3    • Globulin 08/23/2022 2.6    • A/G Ratio 08/23/2022 1.7    • Total Bilirubin 08/23/2022 0.4    • Alkaline Phosphatase 08/23/2022 44    • AST (SGOT) 08/23/2022 22    • ALT (SGPT) 08/23/2022 22    • Total Cholesterol 08/23/2022 139    • Triglycerides 08/23/2022 71    • HDL Cholesterol 08/23/2022 63    • VLDL Cholesterol Pon[place] 08/23/2022 14    • LDL Chol Calc (NIH) 08/23/2022 62    • PSA 08/23/2022 2.2    • TSH 08/23/2022 1.980    • Specific Gravity, UA 08/23/2022 1.014    • pH, UA 08/23/2022 6.0    • Color, UA 08/23/2022 Yellow    • Appearance, UA 08/23/2022 Clear    • Leukocytes, UA 08/23/2022 Negative    • Protein 08/23/2022 Negative    • Glucose, UA 08/23/2022 Negative    • Ketones 08/23/2022 Negative    • Blood, UA 08/23/2022 Negative    • Bilirubin, UA 08/23/2022 Negative    • Urobilinogen, UA 08/23/2022 0.2    • Nitrite, UA 08/23/2022 Negative    • Microscopic Examination 08/23/2022 Comment      Recent labs reviewed and interpreted for clinical significance and application            Level of Care:           Bill Oseguera MD  02/13/23  .

## 2023-03-03 ENCOUNTER — ANESTHESIA EVENT (OUTPATIENT)
Dept: GASTROENTEROLOGY | Facility: HOSPITAL | Age: 63
End: 2023-03-03
Payer: COMMERCIAL

## 2023-03-03 ENCOUNTER — HOSPITAL ENCOUNTER (OUTPATIENT)
Facility: HOSPITAL | Age: 63
Setting detail: HOSPITAL OUTPATIENT SURGERY
Discharge: HOME | End: 2023-03-03
Attending: INTERNAL MEDICINE | Admitting: INTERNAL MEDICINE
Payer: COMMERCIAL

## 2023-03-03 ENCOUNTER — ANESTHESIA (OUTPATIENT)
Dept: GASTROENTEROLOGY | Facility: HOSPITAL | Age: 63
End: 2023-03-03
Payer: COMMERCIAL

## 2023-03-03 ENCOUNTER — ON CAMPUS - OUTPATIENT (OUTPATIENT)
Dept: URBAN - METROPOLITAN AREA HOSPITAL 85 | Facility: HOSPITAL | Age: 63
End: 2023-03-03

## 2023-03-03 VITALS
OXYGEN SATURATION: 100 % | RESPIRATION RATE: 14 BRPM | DIASTOLIC BLOOD PRESSURE: 79 MMHG | WEIGHT: 228.18 LBS | TEMPERATURE: 98.3 F | HEIGHT: 70 IN | BODY MASS INDEX: 32.67 KG/M2 | HEART RATE: 58 BPM | SYSTOLIC BLOOD PRESSURE: 118 MMHG

## 2023-03-03 DIAGNOSIS — K30 INDIGESTION: ICD-10-CM

## 2023-03-03 DIAGNOSIS — I25.10 CAD (CORONARY ARTERY DISEASE): ICD-10-CM

## 2023-03-03 DIAGNOSIS — R13.10 DYSPHAGIA: ICD-10-CM

## 2023-03-03 DIAGNOSIS — B37.81 CANDIDAL ESOPHAGITIS: ICD-10-CM

## 2023-03-03 DIAGNOSIS — R13.10 DYSPHAGIA, UNSPECIFIED: ICD-10-CM

## 2023-03-03 DIAGNOSIS — K22.2 ESOPHAGEAL OBSTRUCTION: ICD-10-CM

## 2023-03-03 PROCEDURE — 43450 DILATE ESOPHAGUS 1/MULT PASS: CPT | Mod: 59 | Performed by: INTERNAL MEDICINE

## 2023-03-03 PROCEDURE — 43239 EGD BIOPSY SINGLE/MULTIPLE: CPT | Performed by: INTERNAL MEDICINE

## 2023-03-03 PROCEDURE — 25010000002 PROPOFOL 200 MG/20ML EMULSION: Performed by: NURSE ANESTHETIST, CERTIFIED REGISTERED

## 2023-03-03 PROCEDURE — 88305 TISSUE EXAM BY PATHOLOGIST: CPT | Performed by: INTERNAL MEDICINE

## 2023-03-03 RX ORDER — SODIUM CHLORIDE 0.9 % (FLUSH) 0.9 %
3-10 SYRINGE (ML) INJECTION AS NEEDED
Status: DISCONTINUED | OUTPATIENT
Start: 2023-03-03 | End: 2023-03-03 | Stop reason: HOSPADM

## 2023-03-03 RX ORDER — SODIUM CHLORIDE 9 MG/ML
30 INJECTION, SOLUTION INTRAVENOUS CONTINUOUS PRN
Status: DISCONTINUED | OUTPATIENT
Start: 2023-03-03 | End: 2023-03-03 | Stop reason: HOSPADM

## 2023-03-03 RX ORDER — ONDANSETRON 2 MG/ML
4 INJECTION INTRAMUSCULAR; INTRAVENOUS ONCE AS NEEDED
Status: DISCONTINUED | OUTPATIENT
Start: 2023-03-03 | End: 2023-03-03 | Stop reason: HOSPADM

## 2023-03-03 RX ORDER — PROPOFOL 10 MG/ML
INJECTION, EMULSION INTRAVENOUS AS NEEDED
Status: DISCONTINUED | OUTPATIENT
Start: 2023-03-03 | End: 2023-03-03 | Stop reason: SURG

## 2023-03-03 RX ORDER — SODIUM CHLORIDE 9 MG/ML
40 INJECTION, SOLUTION INTRAVENOUS AS NEEDED
Status: DISCONTINUED | OUTPATIENT
Start: 2023-03-03 | End: 2023-03-03 | Stop reason: HOSPADM

## 2023-03-03 RX ORDER — SODIUM CHLORIDE 0.9 % (FLUSH) 0.9 %
3 SYRINGE (ML) INJECTION EVERY 12 HOURS SCHEDULED
Status: DISCONTINUED | OUTPATIENT
Start: 2023-03-03 | End: 2023-03-03 | Stop reason: HOSPADM

## 2023-03-03 RX ORDER — LIDOCAINE HYDROCHLORIDE 20 MG/ML
INJECTION, SOLUTION EPIDURAL; INFILTRATION; INTRACAUDAL; PERINEURAL AS NEEDED
Status: DISCONTINUED | OUTPATIENT
Start: 2023-03-03 | End: 2023-03-03 | Stop reason: SURG

## 2023-03-03 RX ORDER — SODIUM CHLORIDE 9 MG/ML
50 INJECTION, SOLUTION INTRAVENOUS ONCE
Status: DISCONTINUED | OUTPATIENT
Start: 2023-03-03 | End: 2023-03-03 | Stop reason: HOSPADM

## 2023-03-03 RX ADMIN — LIDOCAINE HYDROCHLORIDE 100 MG: 20 INJECTION, SOLUTION EPIDURAL; INFILTRATION; INTRACAUDAL; PERINEURAL at 09:51

## 2023-03-03 RX ADMIN — PROPOFOL 240 MG: 10 INJECTION, EMULSION INTRAVENOUS at 09:57

## 2023-03-03 RX ADMIN — SODIUM CHLORIDE: 0.9 INJECTION, SOLUTION INTRAVENOUS at 09:47

## 2023-03-03 NOTE — ANESTHESIA POSTPROCEDURE EVALUATION
Patient: Marysol Rollins    Procedure Summary     Date: 03/03/23 Room / Location: [facility] ENDOSCOPY 1 / [facility] END[facility]COPY    Anesthesia Start: 0947 Anesthesia Stop: 1001    Procedure: ESOPHAGOGASTRODUODENOSCOPY with esophageal biopsy and dilation using 54 Fr. Bougie Dilator Diagnosis:       Dysphagia      Indigestion      CAD (coronary artery disease)      (Dysphagia [R13.10])      (Indigestion [K30])      (CAD (coronary artery disease) [I25.10])    Surgeons: Rei Manuel MD Provider: Francisco Chi MD    Anesthesia Type: general ASA Status: 3          Anesthesia Type: general    Vitals  Vitals Value Taken Time   /79 03/03/23 1022   Temp     Pulse 58 03/03/23 1022   Resp 14 03/03/23 1022   SpO2 100 % 03/03/23 1022           Post Anesthesia Care and Evaluation    Patient location during evaluation: PACU  Patient participation: complete - patient participated  Level of consciousness: awake  Pain scale: See nurse's notes for pain score.  Pain management: adequate    Airway patency: patent  Anesthetic complications: No anesthetic complications  PONV Status: none  Cardiovascular status: acceptable  Respiratory status: acceptable and spontaneous ventilation  Hydration status: acceptable    Comments: Patient seen and examined postoperatively; vital signs stable; SpO2 greater than or equal to 90%; cardiopulmonary status stable; nausea/vomiting adequately controlled; pain adequately controlled; no apparent anesthesia complications; patient discharged from anesthesia care when discharge criteria were met

## 2023-03-03 NOTE — ANESTHESIA PREPROCEDURE EVALUATION
Anesthesia Evaluation     Patient summary reviewed and Nursing notes reviewed   NPO Solid Status: > 8 hours  NPO Liquid Status: > 8 hours           Airway   Mallampati: II  TM distance: >3 FB  Neck ROM: full  No difficulty expected  Dental - normal exam     Pulmonary    Cardiovascular     (+) past MI , CAD, cardiac stents more than 12 months ago hyperlipidemia,       Neuro/Psych  (+) headaches,    GI/Hepatic/Renal/Endo      Musculoskeletal     Abdominal    Substance History      OB/GYN          Other   arthritis, autoimmune disease rheumatoid arthritis, blood dyscrasia thrombocytopenia,                   Anesthesia Plan    ASA 3     general     intravenous induction     Anesthetic plan, risks, benefits, and alternatives have been provided, discussed and informed consent has been obtained with: patient.    Plan discussed with CRNA.        CODE STATUS:

## 2023-03-03 NOTE — OP NOTE
ESOPHAGOGASTRODUODENOSCOPY Procedure Report    Patient Name:  Marysol Rollins  YOB: 1960    Date of Surgery:  3/3/2023     Pre-Op Diagnosis:  Dysphagia, esophageal stricture       Post Op Diagnosis:  Possible mild esophageal candidiasis      Procedure/CPT® Codes:      Procedure(s):  ESOPHAGOGASTRODUODENOSCOPY with esophageal biopsy and dilation using 54 Fr. Bougie Dilator    Staff:  Surgeon(s):  Rei Manuel MD         Anesthesia: Monitored Anesthesia Care    Implants:    Nothing was implanted during the procedure    Specimen:        See Below    No blood loss    Complications:  None     Description of Procedure:  Informed consent was obtained for the procedure, including sedation.  Risks of perforation, hemorrhage, adverse drug reaction and aspiration were discussed.  The patient was brought into the endoscopy suite. Continuous cardiopulmonary monitoring was performed. The patient was placed in the left lateral decubitus position.  The bite block was inserted into the patient's mouth. After adequate sedation was attained, the Olympus gastroscope was inserted into the patient's mouth and advanced to the second portion of the duodenum without difficulty.  Circumferential examination was performed. A retroflex exam was performed in the patient's stomach.  On completion of the exam, the bowel was decompressed, the scope was removed from the patient, the patient tolerated the procedure well, there were no immediate post-operative complications.     Examination of the esophagus showed a few white plaques in the mid esophagus which may represent mild candidal esophagitis.  Differential diagnosis includes pill debris.  Biopsies were obtained and sent for pathology.  No resistance to scope passage at the GE junction however given history of dysphagia and Schatzki ring suggested on esophagram, 54 Bah[place] Perez dilation was performed.  Repeat esophagoscopy confirmed mucosal tearing consistent with  successful dilation  Examination of the stomach showed normal mucosa  Retroflex examination of the stomach was normal   Examination of the duodenum showed normal mucosa      Impression:  Possible mild esophageal candidiasis  History of dysphagia status post 54 French Perez dilation    Recommendations:  Follow-up histopathology  Treat with nystatin if biopsies are positive for Candida  Repeat EGD as needed for dysphagia  Okay to resume Plavix in 3 days      Rei Manuel MD     Date: 3/3/2023  Time: 09:59 EST

## 2023-03-03 NOTE — DISCHARGE INSTRUCTIONS
A responsible adult should stay with you and you should rest quietly for the rest of the day.    Do not drink alcohol, drive, operate any heavy machinery or power tools or make any legal/important decisions for the next 24 hours.     Progress your diet as tolerated.  If you begin to experience severe pain, increased shortness of breath, racing heartbeat or a fever above 101 F, seek immediate medical attention.     Follow up with MD as instructed. Call office for results in 3 to 5 days if needed.   Recommendations:  Follow-up histopathology  Treat with nystatin if biopsies are positive for Candida  Repeat EGD as needed for dysphagia  Okay to resume Plavix in 3 days

## 2023-03-06 LAB
LAB AP CASE REPORT: NORMAL
PATH REPORT.FINAL DX SPEC: NORMAL
PATH REPORT.GROSS SPEC: NORMAL

## 2023-05-08 DIAGNOSIS — R13.19 ESOPHAGEAL DYSPHAGIA: ICD-10-CM

## 2023-05-08 RX ORDER — OMEPRAZOLE 40 MG/1
CAPSULE, DELAYED RELEASE ORAL
Qty: 90 CAPSULE | Refills: 0 | OUTPATIENT
Start: 2023-05-08

## 2023-08-14 DIAGNOSIS — Z12.5 SCREENING FOR PROSTATE CANCER: ICD-10-CM

## 2023-08-14 DIAGNOSIS — E78.2 MIXED HYPERLIPIDEMIA: Primary | ICD-10-CM

## 2023-08-14 DIAGNOSIS — Z00.00 ROUTINE ADULT HEALTH MAINTENANCE: ICD-10-CM

## 2023-08-14 DIAGNOSIS — M05.79 RHEUMATOID ARTHRITIS WITH RHEUMATOID FACTOR OF MULTIPLE SITES WITHOUT ORGAN OR SYSTEMS INVOLVEMENT: ICD-10-CM

## 2023-09-19 ENCOUNTER — OFFICE VISIT (OUTPATIENT)
Dept: FAMILY MEDICINE CLINIC | Facility: CLINIC | Age: 63
End: 2023-09-19
Payer: COMMERCIAL

## 2023-09-19 VITALS
DIASTOLIC BLOOD PRESSURE: 78 MMHG | TEMPERATURE: 97.3 F | BODY MASS INDEX: 32.35 KG/M2 | HEART RATE: 70 BPM | SYSTOLIC BLOOD PRESSURE: 118 MMHG | HEIGHT: 70 IN | OXYGEN SATURATION: 99 % | WEIGHT: 226 LBS

## 2023-09-19 DIAGNOSIS — Z00.00 ROUTINE ADULT HEALTH MAINTENANCE: Primary | ICD-10-CM

## 2023-09-19 DIAGNOSIS — M54.41 ACUTE BILATERAL LOW BACK PAIN WITH RIGHT-SIDED SCIATICA: ICD-10-CM

## 2023-09-19 PROCEDURE — 99396 PREV VISIT EST AGE 40-64: CPT | Performed by: FAMILY MEDICINE

## 2023-09-19 RX ORDER — TIZANIDINE 4 MG/1
4 TABLET ORAL EVERY 6 HOURS PRN
Qty: 30 TABLET | Refills: 0 | Status: SHIPPED | OUTPATIENT
Start: 2023-09-19

## 2023-09-19 NOTE — PROGRESS NOTES
"  Chief Complaint   Patient presents with    Annual Exam       Subjective   Marysol Rollins is a 62 y.o. male and is here for a yearly physical exam. The patient reports problems - worsening lumbar pain.  Right is worse than left.  Also having some sciatic issues down to his right knee.  He has known rheumatoid arthritis .    Do you take any herbs or supplements that were not prescribed by a doctor? yes. If so, these will be added to active medication list.    The following portions of the patient's history were reviewed and updated as appropriate: allergies, current medications, past family history, past medical history, past social history, past surgical history, and problem list.    Social and Family and Surgical History reviewed and updated today, see Rooming tab.    Health History, Preventive Measures and Vaccination flow sheets reviewed and updated today.    Patient's current medical chart in Epic; including previous office notes, Mychart messages, recent phone calls, imaging, labs, specialist's evaluation either in notes or in Media tab reviewed today.    Other outside pertinent medical information also reviewed thru Care Everywhere function is also reviewed today.    Review of Systems  Review of Systems  Pertinent items are noted in HPI.    Vitals:    09/19/23 0956   BP: 118/78   BP Location: Left arm   Patient Position: Sitting   Cuff Size: Adult   Pulse: 70   Temp: 97.3 °F (36.3 °C)   SpO2: 99%   Weight: 103 kg (226 lb)   Height: 177.8 cm (70\")     Body mass index is 32.43 kg/m².      General Appearance:  Alert, cooperative, no distress, appears stated age   Head:  Normocephalic, without obvious abnormality, atraumatic   Eyes:  PERRL, conjunctiva/corneas clear, EOM's intact.   Ears:  Normal TM's and external ear canals, both ears   Nose: Nares normal, septum midline, mucosa normal, no drainage or sinus tenderness   Throat: Lips, mucosa, and tongue normal; teeth and gums normal   Neck: Supple, symmetrical, " trachea midline, no adenopathy;   thyroid: No enlargement/tenderness/nodules; no carotid  bruit   Back:  Symmetric, no curvature, ROM normal, no CVA tenderness   Lungs:  Clear to auscultation bilaterally, respirations unlabored   Chest wall:  No tenderness or deformity   Heart:  Regular rate and rhythm, S1 and S2 normal, no murmur, rub or gallop   Abdomen:  Soft, non-tender, bowel sounds active all four quadrants,   no masses, no organomegaly   Rectal:        Extremities: Extremities normal, atraumatic, no cyanosis or edema   Pulses: 2+ and symmetric all extremities   Skin: Skin color, texture, turgor normal, no rashes or lesions   Lymph nodes: Cervical, supraclavicular, and axillary nodes normal   Neurologic: CNII-XII intact. Normal strength, sensation.            Assessment & Plan   Healthy male exam.  Diagnoses and all orders for this visit:    1. Routine adult health maintenance (Primary)    2. Acute bilateral low back pain with right-sided sciatica  -     tiZANidine (Zanaflex) 4 MG tablet; Take 1 tablet by mouth Every 6 (Six) Hours As Needed for Muscle Spasms.  Dispense: 30 tablet; Refill: 0      1. ,  Labs reviewed.  His serum creatinine is elevated.  This is new for him.  We will continue to monitor his serum creatinine, remind him to drink plenty of water on day of fasting labs to avoid a false elevation  Okay on his medications through rheumatology and cardiology    Had a EGD this spring for esophagitis.  Found to have Candida present in the esophagus.  Sounds like he took nystatin swish and swallow    Takes as needed Celebrex for his low back pain  We will add on as needed Zanaflex      2. Patient Counseling:  --Nutrition: Stressed importance of moderation in: sodium, caffeine, , saturated fat and cholesterol.  Discussed: caloric balance, sufficient intake of fresh fruits, vegetables, fiber, calcium, iron.  --Exercise: Stressed the importance of regular exercise.   --Substance Abuse: Discussed cessation  and or reduction of tobacco, alcohol, or other drug use; driving or other dangerous activities under the influence.    --Dental health: Discussed importance of regular tooth brushing, flossing, and dental visits.  --Suggested having eyes and vision checked if needed or past due.  --Immunizations reviewed and given if needed.  --Discussed benefits of screening colonoscopy and or ColoGuard.  3. Discussed the patient's BMI with him.  The BMI is above average; BMI management plan is completed  4. Follow up in one year    Patient Instructions     Results for orders placed or performed in visit on 08/17/23   Comprehensive Metabolic Panel    Specimen: Blood   Result Value Ref Range    Glucose 88 65 - 99 mg/dL    BUN 14 8 - 23 mg/dL    Creatinine 1.46 (H) 0.76 - 1.27 mg/dL    EGFR Result 53.7 (L) >60.0 mL/min/1.73    BUN/Creatinine Ratio 9.6 7.0 - 25.0    Sodium 142 136 - 145 mmol/L    Potassium 4.3 3.5 - 5.2 mmol/L    Chloride 106 98 - 107 mmol/L    Total CO2 25.1 22.0 - 29.0 mmol/L    Calcium 9.7 8.6 - 10.5 mg/dL    Total Protein 7.0 6.0 - 8.5 g/dL    Albumin 4.4 3.5 - 5.2 g/dL    Globulin 2.6 gm/dL    A/G Ratio 1.7 g/dL    Total Bilirubin 0.3 0.0 - 1.2 mg/dL    Alkaline Phosphatase 45 39 - 117 U/L    AST (SGOT) 23 1 - 40 U/L    ALT (SGPT) 17 1 - 41 U/L   Lipid Panel With LDL / HDL Ratio    Specimen: Blood   Result Value Ref Range    Total Cholesterol 155 0 - 200 mg/dL    Triglycerides 71 0 - 150 mg/dL    HDL Cholesterol 62 (H) 40 - 60 mg/dL    VLDL Cholesterol [place] 14 5 - 40 mg/dL    LDL Chol Calc (NIH) 79 0 - 100 mg/dL    LDL/HDL RATIO 1.27    Urinalysis With Culture If Indicated - Urine, Clean Catch    Specimen: Urine, Clean Catch   Result Value Ref Range    Specific Gravity, UA 1.021 1.005 - 1.030    pH, UA 5.5 5.0 - 7.5    Color, UA Yellow Yellow    Appearance, UA Clear Clear    Leukocytes, UA Negative Negative    Protein Negative Negative/Trace    Glucose, UA Negative Negative    Ketones Negative Negative    Blood,  UA Negative Negative    Bilirubin, UA Negative Negative    Urobilinogen, UA 0.2 0.2 - 1.0 mg/dL    Nitrite, UA Negative Negative    Microscopic Examination Comment     Microscopic Examination See below:     Urinalysis Reflex Comment    PSA Screen    Specimen: Blood   Result Value Ref Range    PSA 2.090 0.000 - 4.000 ng/mL   CBC (No Diff)    Specimen: Blood   Result Value Ref Range    WBC 5.00 3.40 - 10.80 10*3/mm3    RBC 5.16 4.14 - 5.80 10*6/mm3    Hemoglobin 14.6 13.0 - 17.7 g/dL    Hematocrit 44.3 37.5 - 51.0 %    MCV 85.9 79.0 - 97.0 fL    MCH 28.3 26.6 - 33.0 pg    MCHC 33.0 31.5 - 35.7 g/dL    RDW 13.2 12.3 - 15.4 %    Platelets 128 (L) 140 - 450 10*3/mm3   Microscopic Examination -   Result Value Ref Range    WBC, UA None seen 0 - 5 /hpf    RBC, UA 0-2 0 - 2 /hpf    Epithelial Cells (non renal) None seen 0 - 10 /hpf    Casts None seen None seen /lpf    Bacteria, UA None seen None seen/Few /hpf         There are no discontinued medications.     Dr. Juan Fernandez MD  [place], [place]  [facility]

## 2023-09-19 NOTE — PATIENT INSTRUCTIONS
Results for orders placed or performed in visit on 08/17/23   Comprehensive Metabolic Panel    Specimen: Blood   Result Value Ref Range    Glucose 88 65 - 99 mg/dL    BUN 14 8 - 23 mg/dL    Creatinine 1.46 (H) 0.76 - 1.27 mg/dL    EGFR Result 53.7 (L) >60.0 mL/min/1.73    BUN/Creatinine Ratio 9.6 7.0 - 25.0    Sodium 142 136 - 145 mmol/L    Potassium 4.3 3.5 - 5.2 mmol/L    Chloride 106 98 - 107 mmol/L    Total CO2 25.1 22.0 - 29.0 mmol/L    Calcium 9.7 8.6 - 10.5 mg/dL    Total Protein 7.0 6.0 - 8.5 g/dL    Albumin 4.4 3.5 - 5.2 g/dL    Globulin 2.6 gm/dL    A/G Ratio 1.7 g/dL    Total Bilirubin 0.3 0.0 - 1.2 mg/dL    Alkaline Phosphatase 45 39 - 117 U/L    AST (SGOT) 23 1 - 40 U/L    ALT (SGPT) 17 1 - 41 U/L   Lipid Panel With LDL / HDL Ratio    Specimen: Blood   Result Value Ref Range    Total Cholesterol 155 0 - 200 mg/dL    Triglycerides 71 0 - 150 mg/dL    HDL Cholesterol 62 (H) 40 - 60 mg/dL    VLDL Cholesterol [place] 14 5 - 40 mg/dL    LDL Chol Calc (NIH) 79 0 - 100 mg/dL    LDL/HDL RATIO 1.27    Urinalysis With Culture If Indicated - Urine, Clean Catch    Specimen: Urine, Clean Catch   Result Value Ref Range    Specific Gravity, UA 1.021 1.005 - 1.030    pH, UA 5.5 5.0 - 7.5    Color, UA Yellow Yellow    Appearance, UA Clear Clear    Leukocytes, UA Negative Negative    Protein Negative Negative/Trace    Glucose, UA Negative Negative    Ketones Negative Negative    Blood, UA Negative Negative    Bilirubin, UA Negative Negative    Urobilinogen, UA 0.2 0.2 - 1.0 mg/dL    Nitrite, UA Negative Negative    Microscopic Examination Comment     Microscopic Examination See below:     Urinalysis Reflex Comment    PSA Screen    Specimen: Blood   Result Value Ref Range    PSA 2.090 0.000 - 4.000 ng/mL   CBC (No Diff)    Specimen: Blood   Result Value Ref Range    WBC 5.00 3.40 - 10.80 10*3/mm3    RBC 5.16 4.14 - 5.80 10*6/mm3    Hemoglobin 14.6 13.0 - 17.7 g/dL    Hematocrit 44.3 37.5 - 51.0 %    MCV 85.9 79.0 - 97.0 fL     MCH 28.3 26.6 - 33.0 pg    MCHC 33.0 31.5 - 35.7 g/dL    RDW 13.2 12.3 - 15.4 %    Platelets 128 (L) 140 - 450 10*3/mm3   Microscopic Examination -   Result Value Ref Range    WBC, UA None seen 0 - 5 /hpf    RBC, UA 0-2 0 - 2 /hpf    Epithelial Cells (non renal) None seen 0 - 10 /hpf    Casts None seen None seen /lpf    Bacteria, UA None seen None seen/Few /hpf

## 2024-01-17 ENCOUNTER — TELEPHONE (OUTPATIENT)
Dept: FAMILY MEDICINE CLINIC | Facility: CLINIC | Age: 64
End: 2024-01-17
Payer: COMMERCIAL

## 2024-01-17 DIAGNOSIS — M05.79 RHEUMATOID ARTHRITIS WITH RHEUMATOID FACTOR OF MULTIPLE SITES WITHOUT ORGAN OR SYSTEMS INVOLVEMENT: Primary | ICD-10-CM

## 2024-01-17 NOTE — TELEPHONE ENCOUNTER
Pt called back, he found someone he would like to use, [facility]. He is filling out an application for them.

## 2024-01-17 NOTE — TELEPHONE ENCOUNTER
Caller: Marysol Rollins    Relationship: Self    Best call back number: [phone]     What specialty or service is being requested: RHEUMATOLOGY    Any additional details: PATIENT STATES THAT HE WOULD LIKE TO CHANGE RHEUMATOLOGISTS, AND REQUIRES A NEW REFERRAL. PLEASE CALL AND ADVISE

## 2024-02-13 ENCOUNTER — OFFICE VISIT (OUTPATIENT)
Dept: CARDIOLOGY | Facility: CLINIC | Age: 64
End: 2024-02-13
Payer: COMMERCIAL

## 2024-02-13 VITALS
BODY MASS INDEX: 32.78 KG/M2 | SYSTOLIC BLOOD PRESSURE: 127 MMHG | HEIGHT: 70 IN | DIASTOLIC BLOOD PRESSURE: 80 MMHG | HEART RATE: 67 BPM | WEIGHT: 229 LBS | RESPIRATION RATE: 18 BRPM

## 2024-02-13 DIAGNOSIS — I21.4 ACUTE NON-ST ELEVATION MYOCARDIAL INFARCTION (NSTEMI): ICD-10-CM

## 2024-02-13 DIAGNOSIS — I25.119 CORONARY ARTERY DISEASE INVOLVING NATIVE CORONARY ARTERY OF NATIVE HEART WITH ANGINA PECTORIS: ICD-10-CM

## 2024-02-13 DIAGNOSIS — N52.03 COMBINED ARTERIAL INSUFFICIENCY AND CORPORO-VENOUS OCCLUSIVE ERECTILE DYSFUNCTION: ICD-10-CM

## 2024-02-13 DIAGNOSIS — E78.2 MIXED HYPERLIPIDEMIA: Primary | ICD-10-CM

## 2024-02-13 RX ORDER — CLOPIDOGREL BISULFATE 75 MG/1
75 TABLET ORAL DAILY
Qty: 90 TABLET | Refills: 3 | Status: SHIPPED | OUTPATIENT
Start: 2024-02-13 | End: 2024-02-16

## 2024-02-13 RX ORDER — ATORVASTATIN CALCIUM 40 MG/1
40 TABLET, FILM COATED ORAL DAILY
Qty: 90 TABLET | Refills: 3 | Status: SHIPPED | OUTPATIENT
Start: 2024-02-13

## 2024-02-13 RX ORDER — ASPIRIN 81 MG/1
81 TABLET ORAL DAILY
Qty: 90 TABLET | Refills: 3 | Status: SHIPPED | OUTPATIENT
Start: 2024-02-13

## 2024-02-13 RX ORDER — EZETIMIBE 10 MG/1
10 TABLET ORAL DAILY
Qty: 90 TABLET | Refills: 3 | Status: SHIPPED | OUTPATIENT
Start: 2024-02-13

## 2024-02-16 RX ORDER — CLOPIDOGREL BISULFATE 75 MG/1
75 TABLET ORAL DAILY
Qty: 90 TABLET | Refills: 3 | Status: SHIPPED | OUTPATIENT
Start: 2024-02-16

## 2024-02-23 NOTE — PROGRESS NOTES
Cardiology Clinic Note  Bill Oseguera MD, PhD    Subjective:     Encounter Date:02/13/2024      Patient ID: Marysol Rollins is a 63 y.o. male.    Chief Complaint:  Chief Complaint   Patient presents with    Follow-up       HPI:       I the pleasure to see this 63-year-old gentleman in follow-up with history of coronary artery disease.  He had a stent back in 2020, he has hypertension hyperlipidemia and history of MI, is here to establish care, blood pressure 120/84 heart rates in the 60s.  He denies any new onset angina, he is on aspirin statin Plavix but no beta-blockers given heart rates in the 60s and normal blood pressures.  He has a history of persistent rheumatoid arthritis taking Celebrex chronically.  Last 2D echo 2020 revealed an EF of 55% with normal regional and global wall motion, normal RV, normal atrial sizes, no significant valvular abnormality other than trace to mild regurgitation, structurally normal aortic valve, mild aortic root dilatation, cardiac cath report reviewed from March 2020 revealed obstructive disease to the RCA with NSTEMI at that time, overlapping drug-eluting stents were placed to the mid back to proximal RCA, there is residual left-sided disease 60 to 70% but very small caliber distal vessels not optimal for percutaneous intervention that was not La[place] was treated medically.  Circumflex was angiographically normal, LAD 60 to 70% very small caliber, diagonal branch small caliber 60% as well, 95% mid RCA with thrombus present that was stented accordingly, stents for Eucha 2.25 x 22 and 2.5 x 18.     He presents back today otherwise doing well with no anginal,   No unstable angina, heart failure signs or symptoms PND orthopnea palpitations presyncope or other complaints today.  We discussed study addition to his regimen, goals for heart rate blood pressure activity lipids with primary secondary prevention     Historical data copied forward from previous encounters in EMR  "including the history, exam, and assessment/plan has been reviewed and is unchanged unless noted otherwise.     Cardiac medicines reviewed with risk, benefits, and necessity of each discussed.     Risk and benefit of cardiac testing reviewed including death heart attack stroke pain bleeding infection need for vascular /cardiovascular surgery were discussed and the patient      Objective:         Objective          Vitals reviewed below  Physical Exam  Regular rate and rhythm no rubs gallops heaves lift  No clubbing cyanosis or edema  Clear to auscultation  Normal peripheral pulses  Normal CV exam  Assessment:        History of MI 2020  Coronary artery disease, borderline obstructive LAD and small caliber 60 to 70% treated medically, RCA with Ramón drug-eluting stents March 2020  Continue aspirin Plavix  High intensity statin per guidelines  Add Zetia 10 mg daily to his regimen  Not on beta-blocker with normal EF and no chest pain and preserved EF and controlled blood pressure     Essential hypertension controlled  Hyperlipidemia continue statin therapy and aspirin and Plavix     We will leave him on dual any platelet therapy along with statin therapy after 3 years per guidelines as well as with residual LAD disease that is small caliber with low bleeding risk     Can stop Plavix 5 days prior to procedure, continue low-dose aspirin  Restart Plavix post procedurally when able  No CV contraindication does not need ischemic evaluation prior to this procedure     Return to clinic in 1 year, refill all medicines  Back to clinic if he has any questions or concerns or new symptoms       Objective:         /80 (BP Location: Right arm, Patient Position: Sitting)   Pulse 67   Resp 18   Ht 177.8 cm (70\")   Wt 104 kg (229 lb)   BMI 32.86 kg/m²     Physical Exam    Assessment:         There are no diagnoses linked to this encounter.       Plan:              The pleasure to be involved in this patient's cardiovascular " care.  Please call with any questions or concerns  Bill Oseguera MD, PhD    Most recent EKG as reviewed and interpreted by me:  Procedures     Most recent echo as reviewed and interpreted by me:      Most recent stress test as reviewed and interpreted by me:      Most recent cardiac catheterization as reviewed interpreted by me:  No results found for this or any previous visit.    The following portions of the patient's history were reviewed and updated as appropriate: allergies, current medications, past family history, past medical history, past social history, past surgical history, and problem list.      ROS:  14 point review of systems negative except as mentioned above    Current Outpatient Medications:     aspirin 81 MG EC tablet, Take 1 tablet by mouth Daily., Disp: 90 tablet, Rfl: 3    atorvastatin (LIPITOR) 40 MG tablet, Take 1 tablet by mouth Daily., Disp: 90 tablet, Rfl: 3    cholecalciferol (VITAMIN D3) 1000 units tablet, Take 1 tablet by mouth Daily., Disp: , Rfl:     doxazosin (CARDURA) 4 MG tablet, Take 1 tablet by mouth Every Night., Disp: , Rfl:     hydroxychloroquine (PLAQUENIL) 200 MG tablet, Take 1 tablet by mouth 2 (Two) Times a Day., Disp: 180 tablet, Rfl: 1    multivitamin with minerals tablet tablet, Take 1 tablet by mouth Daily., Disp: , Rfl:     nitroglycerin (NITROSTAT) 0.4 MG SL tablet, Place 1 tablet under the tongue Every 5 (Five) Minutes As Needed for Chest Pain. Take no more than 3 doses in 15 minutes., Disp: 25 tablet, Rfl: 3    Saw Palmetto, Serenoa repens, (SAW PALMETTO PO), Take 1 capsule by mouth Daily., Disp: , Rfl:     sildenafil (REVATIO) 20 MG tablet, Take 1 tablet by mouth Daily As Needed (ED)., Disp: 30 tablet, Rfl: 5    celecoxib (CeleBREX) 200 MG capsule, Take 1 capsule by mouth As Needed for Mild Pain . Indications: Rheumatoid Arthritis (Patient not taking: Reported on 2/13/2024), Disp: 90 capsule, Rfl: 1    clopidogrel (PLAVIX) 75 MG tablet, TAKE ONE TABLET BY MOUTH  DAILY, Disp: 90 tablet, Rfl: 3    ezetimibe (ZETIA) 10 MG tablet, Take 1 tablet by mouth Daily., Disp: 90 tablet, Rfl: 3    tiZANidine (Zanaflex) 4 MG tablet, Take 1 tablet by mouth Every 6 (Six) Hours As Needed for Muscle Spasms. (Patient not taking: Reported on 2/13/2024), Disp: 30 tablet, Rfl: 0    Problem List:  Patient Active Problem List   Diagnosis    Benign fibroma of prostate    HLD (hyperlipidemia)    Intractable episodic cluster headache    Routine adult health maintenance    Screening for colon cancer    Screening for prostate cancer    Medication monitoring encounter    Combined arterial insufficiency and corporo-venous occlusive erectile dysfunction    Thrombocytopenia    Rheumatoid arthritis with rheumatoid factor of multiple sites without organ or systems involvement    Coronary artery disease involving native coronary artery of native heart with angina pectoris    Right hydrocele    Acute myocardial infarction    Esophageal dysphagia    Internal hemorrhoids    Anal fissure     Past Medical History:  Past Medical History:   Diagnosis Date    Allergic     Seasonal    Arthritis     Rheumtologist care    CAD (coronary artery disease)      Past Surgical History:  Past Surgical History:   Procedure Laterality Date    CARDIAC CATHETERIZATION  03/16/2020    overlapping drug-eluting stents were placed to the mid back to proximal RCA,    COLONOSCOPY  2010    Repeat 10 yr    COLONOSCOPY W/ BIOPSIES  03/29/2021    Dr KIRA Angulo, no polys.    ENDOSCOPY N/A 03/03/2023    Procedure: ESOPHAGOGASTRODUODENOSCOPY with esophageal biopsy and dilation using 54 Fr. Bougie Dilator;  Surgeon: Rei Manuel MD;  Location: [facility] ENDOSCOPY;  Service: Gastroenterology;  Laterality: N/A;  possible candida infection     Social History:  Social History     Socioeconomic History    Marital status:     Number of children: 1    Years of education: Masters   Tobacco Use    Smoking status: Never    Smokeless tobacco:  Never   Vaping Use    Vaping Use: Never used   Substance and Sexual Activity    Alcohol use: Yes     Alcohol/week: 4.0 standard drinks of alcohol     Types: 2 Cans of beer, 2 Shots of liquor per week     Comment: 1-2 every other day    Drug use: No    Sexual activity: Yes     Partners: Female     Birth control/protection: Post-menopausal     Allergies:  No Known Allergies  Immunizations:  Immunization History   Administered Date(s) Administered    COVID-19 (MODERNA) 1st,2nd,3rd Dose Monovalent 02/05/2021, 03/05/2021, 11/08/2021    COVID-19 (MODERNA) Monovalent Original Booster 08/09/2022    Flu Vaccine Split Quad 09/04/2020    Fluzone (or Fluarix & Flulaval for VFC) >6mos 09/02/2021, 01/05/2023    Shingrix 12/01/2022    flucelvax quad pfs =>4 YRS 01/15/2019            In-Office Procedure(s):  No orders to display        ASCVD RIsk Score::  The ASCVD Risk score (Zeb HERNANDEZ, et al., 2019) failed to calculate for the following reasons:    The patient has a prior MI or stroke diagnosis    Imaging:    Results for orders placed in visit on 09/15/21    SCANNED - IMAGING               Lab Review:   Results Encounter on 08/17/2023   Component Date Value    Glucose 08/17/2023 88     BUN 08/17/2023 14     Creatinine 08/17/2023 1.46 (H)     EGFR Result 08/17/2023 53.7 (L)     BUN/Creatinine Ratio 08/17/2023 9.6     Sodium 08/17/2023 142     Potassium 08/17/2023 4.3     Chloride 08/17/2023 106     Total CO2 08/17/2023 25.1     Calcium 08/17/2023 9.7     Total Protein 08/17/2023 7.0     Albumin 08/17/2023 4.4     Globulin 08/17/2023 2.6     A/G Ratio 08/17/2023 1.7     Total Bilirubin 08/17/2023 0.3     Alkaline Phosphatase 08/17/2023 45     AST (SGOT) 08/17/2023 23     ALT (SGPT) 08/17/2023 17     Total Cholesterol 08/17/2023 155     Triglycerides 08/17/2023 71     HDL Cholesterol 08/17/2023 62 (H)     VLDL Cholesterol [place] 08/17/2023 14     LDL Chol Calc ([facility]) 08/17/2023 79     LDL/HDL RATIO 08/17/2023 1.27     Specific [place],  UA 08/17/2023 1.021     pH, UA 08/17/2023 5.5     Color, UA 08/17/2023 Yellow     Appearance, UA 08/17/2023 Clear     Leukocytes, UA 08/17/2023 Negative     Protein 08/17/2023 Negative     Glucose, UA 08/17/2023 Negative     Ketones 08/17/2023 Negative     Blood, UA 08/17/2023 Negative     Bilirubin, UA 08/17/2023 Negative     Urobilinogen, UA 08/17/2023 0.2     Nitrite, UA 08/17/2023 Negative     Microscopic Examination 08/17/2023 Comment     Microscopic Examination 08/17/2023 See below:     Urinalysis Reflex 08/17/2023 Comment     PSA 08/17/2023 2.090     WBC 08/17/2023 5.00     RBC 08/17/2023 5.16     Hemoglobin 08/17/2023 14.6     Hematocrit 08/17/2023 44.3     MCV 08/17/2023 85.9     MCH 08/17/2023 28.3     MCHC 08/17/2023 33.0     RDW 08/17/2023 13.2     Platelets 08/17/2023 128 (L)     WBC, UA 08/17/2023 None seen     RBC, UA 08/17/2023 0-2     Epithelial Cells (non re* 08/17/2023 None seen     Casts 08/17/2023 None seen     Bacteria, UA 08/17/2023 None seen      Recent labs reviewed and interpreted for clinical significance and application            Level of Care:           Bill Oseguera MD  02/23/24  .

## 2024-05-08 ENCOUNTER — TELEPHONE (OUTPATIENT)
Dept: FAMILY MEDICINE CLINIC | Facility: CLINIC | Age: 64
End: 2024-05-08
Payer: COMMERCIAL

## 2024-05-10 ENCOUNTER — OFFICE VISIT (OUTPATIENT)
Dept: FAMILY MEDICINE CLINIC | Facility: CLINIC | Age: 64
End: 2024-05-10
Payer: COMMERCIAL

## 2024-05-10 ENCOUNTER — TELEPHONE (OUTPATIENT)
Dept: FAMILY MEDICINE CLINIC | Facility: CLINIC | Age: 64
End: 2024-05-10

## 2024-05-10 VITALS
BODY MASS INDEX: 32.93 KG/M2 | HEIGHT: 70 IN | DIASTOLIC BLOOD PRESSURE: 70 MMHG | OXYGEN SATURATION: 100 % | SYSTOLIC BLOOD PRESSURE: 120 MMHG | HEART RATE: 61 BPM | TEMPERATURE: 97.1 F | WEIGHT: 230 LBS

## 2024-05-10 DIAGNOSIS — M05.79 RHEUMATOID ARTHRITIS INVOLVING MULTIPLE SITES WITH POSITIVE RHEUMATOID FACTOR: ICD-10-CM

## 2024-05-10 DIAGNOSIS — N52.03 COMBINED ARTERIAL INSUFFICIENCY AND CORPORO-VENOUS OCCLUSIVE ERECTILE DYSFUNCTION: ICD-10-CM

## 2024-05-10 DIAGNOSIS — M54.41 ACUTE BILATERAL LOW BACK PAIN WITH RIGHT-SIDED SCIATICA: ICD-10-CM

## 2024-05-10 PROCEDURE — 99213 OFFICE O/P EST LOW 20 MIN: CPT | Performed by: FAMILY MEDICINE

## 2024-05-10 RX ORDER — CELECOXIB 200 MG/1
200 CAPSULE ORAL AS NEEDED
Qty: 90 CAPSULE | Refills: 1 | Status: SHIPPED | OUTPATIENT
Start: 2024-05-10 | End: 2024-05-10 | Stop reason: SDUPTHER

## 2024-05-10 RX ORDER — CELECOXIB 200 MG/1
200 CAPSULE ORAL 2 TIMES DAILY PRN
Qty: 90 CAPSULE | Refills: 1 | Status: SHIPPED | OUTPATIENT
Start: 2024-05-10

## 2024-05-10 RX ORDER — SILDENAFIL CITRATE 20 MG/1
20 TABLET ORAL DAILY PRN
Qty: 30 TABLET | Refills: 5 | Status: SHIPPED | OUTPATIENT
Start: 2024-05-10

## 2024-05-10 RX ORDER — TIZANIDINE 4 MG/1
4 TABLET ORAL EVERY 8 HOURS PRN
Qty: 60 TABLET | Refills: 0 | Status: SHIPPED | OUTPATIENT
Start: 2024-05-10

## 2024-05-23 ENCOUNTER — TELEPHONE (OUTPATIENT)
Dept: FAMILY MEDICINE CLINIC | Facility: CLINIC | Age: 64
End: 2024-05-23

## 2024-05-23 RX ORDER — PREDNISONE 10 MG/1
10 TABLET ORAL 3 TIMES DAILY
Qty: 21 TABLET | Refills: 0 | Status: SHIPPED | OUTPATIENT
Start: 2024-05-23

## 2024-05-23 NOTE — TELEPHONE ENCOUNTER
d this encounter to the appropriate pool. See your Call Action Grid or Workflows for direction.    Caller: Marysol Rollins    Relationship: Self    Best call back number: [phone]     What medication are you requesting: PATIENT THOUGHT THAT THE PROVIDER WAS GOING TO ORDER STEROID FOR HIS BACK..  HE DOES NOT THINK PROVIDER WAS SPEAKING OF INJECTION, THINKS HE WAS SAYING HE COULD ORDER STEROID TABLET FOR HIS BACK PAIN.    PLEASE CALL TO DISCUSS/ADVISE.    If a prescription is needed, what is your preferred pharmacy and phone number: [id number] - [place], [place] - [address] - [phone]  - [phone] FX

## 2024-05-23 NOTE — TELEPHONE ENCOUNTER
Pt seen 5/10 for back pain, he was under the impression you were sending a steroid pack in for him. Please advise       I can send in prednisone for his sciatica

## 2024-08-08 DIAGNOSIS — M05.79 RHEUMATOID ARTHRITIS INVOLVING MULTIPLE SITES WITH POSITIVE RHEUMATOID FACTOR: ICD-10-CM

## 2024-08-08 RX ORDER — CELECOXIB 200 MG/1
CAPSULE ORAL
Qty: 90 CAPSULE | Refills: 1 | Status: SHIPPED | OUTPATIENT
Start: 2024-08-08

## 2024-09-16 DIAGNOSIS — E78.2 MIXED HYPERLIPIDEMIA: ICD-10-CM

## 2024-09-16 DIAGNOSIS — Z00.00 ROUTINE ADULT HEALTH MAINTENANCE: Primary | ICD-10-CM

## 2024-09-16 DIAGNOSIS — Z12.5 SCREENING FOR PROSTATE CANCER: ICD-10-CM

## 2024-09-24 ENCOUNTER — OFFICE VISIT (OUTPATIENT)
Dept: FAMILY MEDICINE CLINIC | Facility: CLINIC | Age: 64
End: 2024-09-24
Payer: COMMERCIAL

## 2024-09-24 VITALS
SYSTOLIC BLOOD PRESSURE: 118 MMHG | BODY MASS INDEX: 32.78 KG/M2 | DIASTOLIC BLOOD PRESSURE: 80 MMHG | TEMPERATURE: 97.9 F | OXYGEN SATURATION: 90 % | HEART RATE: 72 BPM | HEIGHT: 70 IN | WEIGHT: 229 LBS

## 2024-09-24 DIAGNOSIS — M51.369 DDD (DEGENERATIVE DISC DISEASE), LUMBAR: ICD-10-CM

## 2024-09-24 DIAGNOSIS — M54.31 BILATERAL SCIATICA: ICD-10-CM

## 2024-09-24 DIAGNOSIS — I73.9 PAD (PERIPHERAL ARTERY DISEASE): ICD-10-CM

## 2024-09-24 DIAGNOSIS — Z00.00 ROUTINE ADULT HEALTH MAINTENANCE: Primary | ICD-10-CM

## 2024-09-24 DIAGNOSIS — M54.32 BILATERAL SCIATICA: ICD-10-CM

## 2024-09-24 DIAGNOSIS — M05.79 RHEUMATOID ARTHRITIS WITH RHEUMATOID FACTOR OF MULTIPLE SITES WITHOUT ORGAN OR SYSTEMS INVOLVEMENT: ICD-10-CM

## 2024-09-24 DIAGNOSIS — Z12.5 SCREENING FOR PROSTATE CANCER: ICD-10-CM

## 2024-09-24 PROCEDURE — 99396 PREV VISIT EST AGE 40-64: CPT | Performed by: FAMILY MEDICINE

## 2024-09-24 RX ORDER — TRAMADOL HYDROCHLORIDE 50 MG/1
50 TABLET ORAL EVERY 8 HOURS PRN
Qty: 20 TABLET | Refills: 0 | Status: SHIPPED | OUTPATIENT
Start: 2024-09-24

## 2024-10-07 ENCOUNTER — TELEPHONE (OUTPATIENT)
Dept: FAMILY MEDICINE CLINIC | Facility: CLINIC | Age: 64
End: 2024-10-07

## 2024-10-07 NOTE — TELEPHONE ENCOUNTER
Caller: Marysol Rollins    Relationship to patient: Self    Best call back number: [phone]     Chief complaint: 3 WEEK F/U-REVIEW RESULTS     Type of visit: OFFICE VISIT     Requested date: WEEK OF 10/21/24    If rescheduling, when is the original appointment: 10/28/24     Additional notes:PATIENT WOULD LIKE TO BE SEEN THE WEEK AFTER HE TAKES HIS TEST    PATIENT STATES HIS TEST IS SCHEDULED FOR 10/18/24     PATIENT WOULD LIKE A CALLBACK

## 2024-10-16 ENCOUNTER — HOSPITAL ENCOUNTER (OUTPATIENT)
Dept: MRI IMAGING | Facility: HOSPITAL | Age: 64
Discharge: HOME | End: 2024-10-16
Admitting: FAMILY MEDICINE
Payer: MEDICARE

## 2024-10-16 DIAGNOSIS — M54.32 BILATERAL SCIATICA: ICD-10-CM

## 2024-10-16 DIAGNOSIS — M51.369 DDD (DEGENERATIVE DISC DISEASE), LUMBAR: ICD-10-CM

## 2024-10-16 DIAGNOSIS — M54.31 BILATERAL SCIATICA: ICD-10-CM

## 2024-10-16 DIAGNOSIS — M05.79 RHEUMATOID ARTHRITIS WITH RHEUMATOID FACTOR OF MULTIPLE SITES WITHOUT ORGAN OR SYSTEMS INVOLVEMENT: ICD-10-CM

## 2024-10-16 PROCEDURE — 72148 MRI LUMBAR SPINE W/O DYE: CPT

## 2024-10-17 RX ORDER — TRAMADOL HYDROCHLORIDE 50 MG/1
50 TABLET ORAL EVERY 8 HOURS PRN
Qty: 20 TABLET | Refills: 0 | Status: SHIPPED | OUTPATIENT
Start: 2024-10-17

## 2024-10-18 ENCOUNTER — HOSPITAL ENCOUNTER (OUTPATIENT)
Dept: CT IMAGING | Facility: HOSPITAL | Age: 64
Discharge: HOME | End: 2024-10-18
Payer: MEDICARE

## 2024-10-18 DIAGNOSIS — M05.79 RHEUMATOID ARTHRITIS WITH RHEUMATOID FACTOR OF MULTIPLE SITES WITHOUT ORGAN OR SYSTEMS INVOLVEMENT: ICD-10-CM

## 2024-10-18 DIAGNOSIS — M54.31 BILATERAL SCIATICA: ICD-10-CM

## 2024-10-18 DIAGNOSIS — M54.32 BILATERAL SCIATICA: ICD-10-CM

## 2024-10-18 DIAGNOSIS — I73.9 PAD (PERIPHERAL ARTERY DISEASE): ICD-10-CM

## 2024-10-18 DIAGNOSIS — M51.369 DDD (DEGENERATIVE DISC DISEASE), LUMBAR: ICD-10-CM

## 2024-10-18 LAB
CREAT BLDA-MCNC: 1.3 MG/DL (ref 0.6–1.3)
EGFRCR SERPLBLD CKD-EPI 2021: 61 ML/MIN/1.73

## 2024-10-18 PROCEDURE — 25510000001 IOPAMIDOL PER 1 ML: Performed by: FAMILY MEDICINE

## 2024-10-18 PROCEDURE — 75635 CT ANGIO ABDOMINAL ARTERIES: CPT

## 2024-10-18 PROCEDURE — 82565 ASSAY OF CREATININE: CPT

## 2024-10-18 RX ORDER — IOPAMIDOL 755 MG/ML
100 INJECTION, SOLUTION INTRAVASCULAR
Status: COMPLETED | OUTPATIENT
Start: 2024-10-18 | End: 2024-10-18

## 2024-10-18 RX ADMIN — IOPAMIDOL 100 ML: 755 INJECTION, SOLUTION INTRAVENOUS at 17:22

## 2024-10-28 ENCOUNTER — OFFICE VISIT (OUTPATIENT)
Dept: FAMILY MEDICINE CLINIC | Facility: CLINIC | Age: 64
End: 2024-10-28
Payer: MEDICARE

## 2024-10-28 VITALS
OXYGEN SATURATION: 98 % | HEART RATE: 72 BPM | WEIGHT: 231 LBS | DIASTOLIC BLOOD PRESSURE: 72 MMHG | SYSTOLIC BLOOD PRESSURE: 122 MMHG | HEIGHT: 70 IN | BODY MASS INDEX: 33.07 KG/M2 | TEMPERATURE: 98.1 F

## 2024-10-28 DIAGNOSIS — M54.41 ACUTE BILATERAL LOW BACK PAIN WITH RIGHT-SIDED SCIATICA: ICD-10-CM

## 2024-10-28 DIAGNOSIS — M51.362 DEGENERATION OF INTERVERTEBRAL DISC OF LUMBAR REGION WITH DISCOGENIC BACK PAIN AND LOWER EXTREMITY PAIN: Primary | ICD-10-CM

## 2024-10-28 DIAGNOSIS — I77.89: ICD-10-CM

## 2024-10-28 DIAGNOSIS — M48.062 LUMBAR STENOSIS WITH NEUROGENIC CLAUDICATION: ICD-10-CM

## 2024-10-28 PROCEDURE — 1125F AMNT PAIN NOTED PAIN PRSNT: CPT | Performed by: FAMILY MEDICINE

## 2024-10-28 PROCEDURE — 1159F MED LIST DOCD IN RCRD: CPT | Performed by: FAMILY MEDICINE

## 2024-10-28 PROCEDURE — 99214 OFFICE O/P EST MOD 30 MIN: CPT | Performed by: FAMILY MEDICINE

## 2024-10-28 PROCEDURE — 1160F RVW MEDS BY RX/DR IN RCRD: CPT | Performed by: FAMILY MEDICINE

## 2024-10-28 NOTE — PROGRESS NOTES
"  Subjective   Marysol Rollins is a 64 y.o. male who is here for   Chief Complaint   Patient presents with    Back Pain   .     History of Present Illness     Patient is here to follow-up on his chronic low back pain also pain in both lower legs  We obtain lumbar MRI  Also obtain CT angiogram of legs    The following portions of the patient's history were reviewed and updated as appropriate: allergies, current medications, past family history, past medical history, past social history, past surgical history, and problem list.    Review of Systems    Objective   Vitals:    10/28/24 1242   BP: 122/72   BP Location: Left arm   Patient Position: Sitting   Cuff Size: Large Adult   Pulse: 72   Temp: 98.1 °F (36.7 °C)   SpO2: 98%   Weight: 105 kg (231 lb)   Height: 177.8 cm (70\")      Physical Exam  Vitals reviewed.   Cardiovascular:      Pulses: Normal pulses.   Musculoskeletal:      Right lower leg: No edema.      Left lower leg: No edema.   Neurological:      Mental Status: He is alert.       MRI Lumbar Spine Without Contrast (10/16/2024 09:58)   CT Angio Abdominal Aorta Bilateral Iliofem Runoff (10/18/2024 17:20)   Assessment & Plan   Diagnoses and all orders for this visit:    1. Degeneration of intervertebral disc of lumbar region with discogenic back pain and lower extremity pain (Primary)  -     Ambulatory Referral to Neurosurgery    2. Tibial artery disease  -     Ambulatory Referral to Vascular Surgery    3. Lumbar stenosis with neurogenic claudication  -     Ambulatory Referral to Neurosurgery    Lumbar MRI reveals severe spinal stenosis at lumbar 3-4    CT angiogram reveals tibial artery disease on both sides    Will refer to both neurosurgery and vascular surgery at [place], [place]  There are no Patient Instructions on file for this visit.    There are no discontinued medications.     No follow-ups on file.    Dr. Juan Fernandez  [facility]  [place], " [place].

## 2024-11-06 ENCOUNTER — OFFICE VISIT (OUTPATIENT)
Dept: NEUROSURGERY | Facility: CLINIC | Age: 64
End: 2024-11-06
Payer: MEDICARE

## 2024-11-06 VITALS
BODY MASS INDEX: 33.21 KG/M2 | SYSTOLIC BLOOD PRESSURE: 154 MMHG | WEIGHT: 232 LBS | DIASTOLIC BLOOD PRESSURE: 91 MMHG | HEIGHT: 70 IN | OXYGEN SATURATION: 99 % | HEART RATE: 74 BPM

## 2024-11-06 DIAGNOSIS — M51.362 DEGENERATION OF INTERVERTEBRAL DISC OF LUMBAR REGION WITH DISCOGENIC BACK PAIN AND LOWER EXTREMITY PAIN: Primary | ICD-10-CM

## 2024-11-06 RX ORDER — CELECOXIB 200 MG/1
CAPSULE ORAL
COMMUNITY

## 2024-11-07 NOTE — PROGRESS NOTES
"Subjective   History of Present Illness: Marysol Rollins is a 64 y.o. male is being seen for consultation today at the request of Juan Fernandez MD chronic history of lower back pain and bilateral right over left hip, buttock pain.  This has been significantly worsening over the last year.  He does have some separate calf soreness when he walks.  He also describes some right groin pain.  Symptoms seem to be worse when he is up walking and better when he sits.  He also describes significant pain when he bends forward and comes up.  He describes no paresthesias in his lower extremities or specific radiating lower extremity pain.  While his pain he describes has worsened, he is describing no loss of ADLs and feels like he can manage the pain.  He has been utilizing anti-inflammatories, muscle relaxers and tramadol for his symptoms.  He describes no bowel/bladder dysfunction or saddle anesthesia    History of Present Illness    The following portions of the patient's history were reviewed and updated as appropriate: allergies, current medications, past family history, past medical history, past social history, past surgical history, and problem list.    Review of Systems   Constitutional:  Positive for activity change.   HENT: Negative.     Eyes: Negative.    Respiratory: Negative.     Cardiovascular: Negative.    Gastrointestinal: Negative.    Endocrine: Negative.    Genitourinary: Negative.    Musculoskeletal:  Positive for arthralgias, back pain (bilateral hips/legs) and myalgias.   Skin: Negative.    Allergic/Immunologic: Negative.    Neurological: Negative.    Hematological: Negative.    Psychiatric/Behavioral:  Positive for sleep disturbance.    All other systems reviewed and are negative.      Objective     ./82 (BP Location: Right arm, Patient Position: Sitting)   Pulse 78   Ht 177.8 cm (70\")   Wt 103 kg (228 lb)   SpO2 98%   BMI 32.71 kg/m²    Body mass index is 32.71 kg/m².    Vitals:    " 11/13/24 1003   PainSc:   6          Physical Exam  Neurological Exam  Lower extremity motor strength 5/5  Positive facet loading maneuvers      Assessment & Plan   Independent Review of Radiographic Studies:      I personally reviewed and interpreted the images from the following studies.    Lumbar MRI 10/16/2024    Severe canal stenosis and L2-L3 secondary to chronic disc bulge and facet arthropathy.  This does cause some nerve root clustering below this level.  At L3-L4 left over right lateral recess narrowing and left foraminal narrowing.    Medical Decision Making:      Marysol Crain a 64 y.o. male medical history significant for rheumatoid arthritis, CAD s/p MI and stent placement on Plavix therapy presenting with worsening mixed symptoms.  He does have significant canal stenosis at L2-L3 with some nerve root clumping/arachnoiditis below the stenosis site that is likely causing his mixed presentation.  Physical exam was reassuring with no muscle weakness.  At this juncture, patient's symptoms are mild and he has undergone no real targeted medical management for symptomatology.  He has agreed a course of physical therapy and targeted injection.  I do recommend initiating gabapentin therapy but I will defer this to his primary care provider given his kidney function.  I will order flexion-extension imaging should patient need to return or follow-up if his symptoms worsen or he fails targeted medical management.  Patient agrees to plan of care and wishes to proceed.  I encouraged him  call the office with questions or concerns.  Diagnoses and all orders for this visit:    1. Spinal stenosis, lumbar region, with neurogenic claudication (Primary)  -     Epidural Block  -     Ambulatory Referral to Physical Therapy for Evaluation & Treatment  -     XR Spine Lumbar Complete With Flex & Ext; Future    2. Lumbar facet joint pain  -     Ambulatory Referral to Physical Therapy for Evaluation & Treatment    Other  orders  -     methylPREDNISolone (MEDROL) 4 MG dose pack; Take as directed on package instructions.  Dispense: 1 each; Refill: 0      Return if symptoms worsen or fail to improve.    This patient was examined wearing appropriate personal protective equipment.     Aiyanajohanneellen ANTONIO Rollins  reports that he has never smoked. He has never used smokeless tobacco.     Body mass index is 32.71 kg/m².    BMI is >= 30 and <35. (Class 1 Obesity). Recommendations for exercise counseling/recommendations and nutrition counseling/recommendations    Patient's blood pressure was reviewed.  Recommendations for  a low-salt diet and exercise to maintain/improve BP in addition to taking any presribed medications.    Advance Care Planning   ACP discussion was held with the patient during this visit. Patient does not have an advance directive, declines further assistance.         Krys Narayanan DNP, APRN  11/13/24  11:09 EST

## 2024-11-13 ENCOUNTER — OFFICE VISIT (OUTPATIENT)
Dept: NEUROSURGERY | Facility: CLINIC | Age: 64
End: 2024-11-13
Payer: MEDICARE

## 2024-11-13 VITALS
HEIGHT: 70 IN | WEIGHT: 228 LBS | HEART RATE: 78 BPM | DIASTOLIC BLOOD PRESSURE: 82 MMHG | SYSTOLIC BLOOD PRESSURE: 131 MMHG | BODY MASS INDEX: 32.64 KG/M2 | OXYGEN SATURATION: 98 %

## 2024-11-13 DIAGNOSIS — M48.062 SPINAL STENOSIS, LUMBAR REGION, WITH NEUROGENIC CLAUDICATION: Primary | ICD-10-CM

## 2024-11-13 DIAGNOSIS — M54.59 LUMBAR FACET JOINT PAIN: ICD-10-CM

## 2024-11-13 PROCEDURE — 99204 OFFICE O/P NEW MOD 45 MIN: CPT | Performed by: NURSE PRACTITIONER

## 2024-11-13 PROCEDURE — 1159F MED LIST DOCD IN RCRD: CPT | Performed by: NURSE PRACTITIONER

## 2024-11-13 PROCEDURE — 1160F RVW MEDS BY RX/DR IN RCRD: CPT | Performed by: NURSE PRACTITIONER

## 2024-11-13 RX ORDER — METHYLPREDNISOLONE 4 MG/1
TABLET ORAL
Qty: 1 EACH | Refills: 0 | Status: SHIPPED | OUTPATIENT
Start: 2024-11-13

## 2024-11-14 ENCOUNTER — PATIENT ROUNDING (BHMG ONLY) (OUTPATIENT)
Dept: NEUROSURGERY | Facility: CLINIC | Age: 64
End: 2024-11-14
Payer: MEDICARE

## 2024-11-18 ENCOUNTER — OFFICE VISIT (OUTPATIENT)
Age: 64
End: 2024-11-18
Payer: MEDICARE

## 2024-11-18 VITALS
DIASTOLIC BLOOD PRESSURE: 92 MMHG | HEIGHT: 70 IN | BODY MASS INDEX: 32.67 KG/M2 | WEIGHT: 228.2 LBS | SYSTOLIC BLOOD PRESSURE: 186 MMHG

## 2024-11-18 DIAGNOSIS — I73.9 PERIPHERAL VASCULAR DISEASE, UNSPECIFIED: Primary | ICD-10-CM

## 2024-11-18 PROCEDURE — 1159F MED LIST DOCD IN RCRD: CPT | Performed by: SURGERY

## 2024-11-18 PROCEDURE — 99204 OFFICE O/P NEW MOD 45 MIN: CPT | Performed by: SURGERY

## 2024-11-18 PROCEDURE — 1160F RVW MEDS BY RX/DR IN RCRD: CPT | Performed by: SURGERY

## 2024-11-18 NOTE — PROGRESS NOTES
"Chief Complaint  artery stenois    Subjective        Marysol Rollins presents to [facility] VASCULAR SURGERY  History of Present Illness    New patient evaluation for peripheral arterial disease.  Patient has leg pain.  Recently diagnosed with severe multilevel lumbar degenerative back disease.    Objective   Vital Signs:  BP (!) 186/92 (BP Location: Left arm, Patient Position: Sitting)   Ht 177.8 cm (70\")   Wt 104 kg (228 lb 3.2 oz)   BMI 32.74 kg/m²   Estimated body mass index is 32.74 kg/m² as calculated from the following:    Height as of this encounter: 177.8 cm (70\").    Weight as of this encounter: 104 kg (228 lb 3.2 oz).           Marysol Rollins  reports that he has never smoked. He has never used smokeless tobacco.     Physical Exam  Constitutional:       Appearance: He is well-developed.   Pulmonary:      Effort: Pulmonary effort is normal. No respiratory distress.   Abdominal:      General: There is no distension.      Palpations: Abdomen is soft.   Neurological:      Mental Status: He is alert and oriented to person, place, and time.        Result Review :    The following data was reviewed by: Manuel Green MD on 11/18/24  CBC          9/19/2024    11:20   CBC   WBC 5.67    RBC 5.17    Hemoglobin 14.6    Hematocrit 44.6    MCV 86.3    MCH 28.2    MCHC 32.7    RDW 12.7    Platelets 146       BMP          9/19/2024    11:20 10/18/2024    16:48   BMP   BUN 16     Creatinine 1.42  1.30    Sodium 142     Potassium 4.7     Chloride 105     CO2 25.6     Calcium 9.6            Data reviewed : Radiologic studies as below.  Vascular Surgical History:    Vascular Imaging History:  CT angiogram with runoff:  10/18/2024:  1. No significant aortoiliac arterial occlusive disease identified on either side.  2. No significant femoral-popliteal arterial occlusive disease identified on either side.  3. Significant infrapopliteal arterial occlusive disease is identified bilaterally. Please see " above discussion.  The right posterior tibial artery appears to be the solitary inline flow to the right foot.   There is otherwise intact two-vessel runoff to the left foot   via the posterior tibial and peroneal arteries.    MRI of the lumbar spine:  10/16/2024: Severe multilevel degenerative disease    (Text in bold font has been individually reviewed by myself and confirmed)         Assessment and Plan     Vascular surgery following for:  Peripheral vascular disease    Diagnoses and all orders for this visit:    1. Peripheral vascular disease, unspecified (Primary)    Patient has multiple lower extremity issues.  I believe the majority of his leg symptoms are more related to neurogenic claudication from his multilevel severe degenerative disease seen on MRI.  He is in follow-up with neurosurgery and pain management to consider epidural injections.  On reviewing his CT scan.  He does indeed have infrapopliteal disease but is intact runoff at least to a single vessel bilaterally.  As a general rule of thumb single-vessel runoff will provide functionally normal perfusion to the cath.  As such I would recommend continuing medical therapy and see me back on an as-needed basis      Vascular medical management:Patient should continue taking aspirin 81 mg daily, Plavix 75 mg daily, and Lipitor 40 mg daily  Return if symptoms worsen or fail to improve.  Patient was given instructions and counseling regarding his condition or for health maintenance advice. Please see specific information pulled into the AVS if appropriate.

## 2024-12-03 ENCOUNTER — TREATMENT (OUTPATIENT)
Dept: PHYSICAL THERAPY | Facility: CLINIC | Age: 64
End: 2024-12-03
Payer: MEDICARE

## 2024-12-03 DIAGNOSIS — M54.59 LUMBAR FACET JOINT PAIN: ICD-10-CM

## 2024-12-03 DIAGNOSIS — M48.062 SPINAL STENOSIS, LUMBAR REGION WITH NEUROGENIC CLAUDICATION: Primary | ICD-10-CM

## 2024-12-03 PROCEDURE — 97110 THERAPEUTIC EXERCISES: CPT | Performed by: PHYSICAL THERAPIST

## 2024-12-03 PROCEDURE — 97530 THERAPEUTIC ACTIVITIES: CPT | Performed by: PHYSICAL THERAPIST

## 2024-12-03 PROCEDURE — 97161 PT EVAL LOW COMPLEX 20 MIN: CPT | Performed by: PHYSICAL THERAPIST
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
## 2024-12-06 ENCOUNTER — TREATMENT (OUTPATIENT)
Dept: PHYSICAL THERAPY | Facility: CLINIC | Age: 64
End: 2024-12-06
Payer: MEDICARE

## 2024-12-06 ENCOUNTER — TELEPHONE (OUTPATIENT)
Dept: PHYSICAL THERAPY | Facility: CLINIC | Age: 64
End: 2024-12-06

## 2024-12-06 DIAGNOSIS — M48.062 SPINAL STENOSIS, LUMBAR REGION WITH NEUROGENIC CLAUDICATION: Primary | ICD-10-CM

## 2024-12-06 DIAGNOSIS — M54.59 LUMBAR FACET JOINT PAIN: ICD-10-CM

## 2024-12-06 NOTE — PROGRESS NOTES
Physical Therapy Daily Treatment Note    [address] [facility] [address], [address]  [place] [address]  [phone]    Patient: Marysol Rollins  : 1960  Referring practitioner: APOLLO Post  Diagnosis/ ICD10 code: Spinal stenosis, lumbar region with neurogenic claudication [M48.062]  Today's Date: 2024    VISIT#: 2    Subjective   Pt reports: doing ok this morning. Was a little sore after last visit. Feels the exercise he has been doing at home is beneficial and feels better afterwards. Presently rates pain 4-5/10.       Objective     See Exercise, Manual, and Modality Logs for complete treatment. Progressed with there ex and HEP as noted.     Patient Education:    Assessment & Plan       Assessment  Assessment details: Pt tolerated today's rx session and progression of his HEP well. Good initial respond to ES .           Progress per Plan of Care            Timed:         Manual Therapy:         mins  38926;     Therapeutic Exercise:   15     mins  80525;     Neuromuscular Kamila:  10      mins  26606;    Therapeutic Activity:          mins  37974;     Gait Training:           mins  72615;     Ultrasound:          mins  67629;    Ionto                                   mins   53953  Self Care                            mins   21500      Un-Timed:  Electrical Stimulation:   20     mins  36587 ( );  Traction          mins 79128  Canal repositioning           mins    44339        Timed Treatment:  25    mins   Total Treatment:    43    mins    Adalid Genao PT, CLT  Physical Therapist  [place] License:  # [id number]

## 2024-12-10 ENCOUNTER — TREATMENT (OUTPATIENT)
Dept: PHYSICAL THERAPY | Facility: CLINIC | Age: 64
End: 2024-12-10
Payer: MEDICARE

## 2024-12-10 DIAGNOSIS — M48.062 SPINAL STENOSIS, LUMBAR REGION WITH NEUROGENIC CLAUDICATION: Primary | ICD-10-CM

## 2024-12-10 DIAGNOSIS — M54.59 LUMBAR FACET JOINT PAIN: ICD-10-CM

## 2024-12-10 PROCEDURE — 97112 NEUROMUSCULAR REEDUCATION: CPT | Performed by: PHYSICAL THERAPIST

## 2024-12-10 PROCEDURE — 97110 THERAPEUTIC EXERCISES: CPT | Performed by: PHYSICAL THERAPIST

## 2024-12-10 PROCEDURE — G0283 ELEC STIM OTHER THAN WOUND: HCPCS | Performed by: PHYSICAL THERAPIST

## 2024-12-10 NOTE — PROGRESS NOTES
Physical Therapy Daily Treatment Note    [address] [facility], [address]  [place] [address]  [phone]    Patient: Marysol Rollins  : 1960  Referring practitioner: APOLLO Post  Diagnosis/ ICD10 code: Spinal stenosis, lumbar region with neurogenic claudication [M48.062]  Today's Date: 12/10/2024    VISIT#: 3    Subjective   Pt reports: doing ok , his R hip/ Glut area has been bothering him.  Has been doing his HEP.       Objective     See Exercise, Manual, and Modality Logs for complete treatment. Continued/ progressed with there ex and HEP as noted. Concluded with ES for pain management.     Patient Education:    Assessment & Plan       Assessment  Assessment details: Pt tolerated progression of his program and HEP. Good respond to ES for pain control. Presents with mod tightness in both hips R worse than L.            Progress per Plan of Care            Timed:         Manual Therapy:         mins  04158;     Therapeutic Exercise:   20      mins  24674;     Neuromuscular Kamila:   10     mins  75847;    Therapeutic Activity:          mins  40599;     Gait Training:           mins  98756;     Ultrasound:          mins  63601;    Ionto                                   mins   19388  Self Care                            mins   36208      Un-Timed:  Electrical Stimulation:  18       mins  56594 ( );  Traction          mins 35630  Canal repositioning           mins    27793        Timed Treatment:  30    mins   Total Treatment:     48   mins    Adalid Genao PT, CLT  Physical Therapist  [place] License:  # [id number]

## 2024-12-13 ENCOUNTER — TREATMENT (OUTPATIENT)
Dept: PHYSICAL THERAPY | Facility: CLINIC | Age: 64
End: 2024-12-13
Payer: MEDICARE

## 2024-12-13 DIAGNOSIS — M48.062 SPINAL STENOSIS, LUMBAR REGION WITH NEUROGENIC CLAUDICATION: Primary | ICD-10-CM

## 2024-12-13 DIAGNOSIS — M54.59 LUMBAR FACET JOINT PAIN: ICD-10-CM

## 2024-12-13 NOTE — PROGRESS NOTES
Physical Therapy Daily Treatment Note    [address] [facility] [address], [address]  [place] [address]  [phone]    Patient: Marysol Rollins  : 1960  Referring practitioner: APOLLO Post  Diagnosis/ ICD10 code: Spinal stenosis, lumbar region with neurogenic claudication [M48.062]  Today's Date: 2024    VISIT#: 4    Subjective   Pt reports: his hips are hurting today. HEP going well.       Objective     See Exercise, Manual, and Modality Logs for complete treatment. Initiated Nustep today. Felt looser afterwards.     Patient Education:    Assessment & Plan       Assessment  Assessment details: Pt tolerated today's rx session well. Demonstrates understanding of his HEP. Still having pain in his hips but LBP is better.           Progress per Plan of Care            Timed:         Manual Therapy:         mins  47543;     Therapeutic Exercise:   20      mins  17192;     Neuromuscular Kamila:   10     mins  56029;    Therapeutic Activity:          mins  88290;     Gait Training:           mins  79978;     Ultrasound:          mins  64069;    Ionto                                   mins   16038  Self Care                            mins   76035      Un-Timed:  Electrical Stimulation:   18      mins  70216 ( );  Traction          mins 66330  Canal repositioning           mins    63720        Timed Treatment:  30    mins   Total Treatment:    48    mins    Adalid Genao PT, CLT  Physical Therapist  [place] License:  # [id number]

## 2024-12-17 ENCOUNTER — TREATMENT (OUTPATIENT)
Dept: PHYSICAL THERAPY | Facility: CLINIC | Age: 64
End: 2024-12-17
Payer: MEDICARE

## 2024-12-17 DIAGNOSIS — M48.062 SPINAL STENOSIS, LUMBAR REGION WITH NEUROGENIC CLAUDICATION: Primary | ICD-10-CM

## 2024-12-17 DIAGNOSIS — M54.59 LUMBAR FACET JOINT PAIN: ICD-10-CM

## 2024-12-17 PROCEDURE — 97112 NEUROMUSCULAR REEDUCATION: CPT | Performed by: PHYSICAL THERAPIST

## 2024-12-17 PROCEDURE — 97012 MECHANICAL TRACTION THERAPY: CPT | Performed by: PHYSICAL THERAPIST

## 2024-12-17 PROCEDURE — 97110 THERAPEUTIC EXERCISES: CPT | Performed by: PHYSICAL THERAPIST

## 2024-12-17 NOTE — PROGRESS NOTES
Physical Therapy Daily Treatment Note    [address] [facility], [address]  [place] [address]  [phone]    Patient: Marysol Rollins  : 1960  Referring practitioner: APOLLO Post  Diagnosis/ ICD10 code: Spinal stenosis, lumbar region with neurogenic claudication [M48.062]  Today's Date: 2024    VISIT#: 5    Subjective   Pt reports: his back is doing better but still has pain in R hip. The exercises going well. Feels the stretches help.       Objective     See Exercise, Manual, and Modality Logs for complete treatment. Trial of lumbar traction today to R/O radiculopathy vc hip arthritis.      Patient Education:    Assessment & Plan       Assessment  Assessment details: Good initial respond to traction today.  Reported decrease pain in R hip afterwards. Assess long term respond to traction.           Progress per Plan of Care            Timed:         Manual Therapy:         mins  64359;     Therapeutic Exercise:  16      mins  91507;     Neuromuscular Kamila:  10      mins  48791;    Therapeutic Activity:          mins  67951;     Gait Training:           mins  09255;     Ultrasound:          mins  56249;    Ionto                                   mins   15681  Self Care                            mins   61211      Un-Timed:  Electrical Stimulation:         mins  29092 ( );  Traction    20      mins 65788  Canal repositioning           mins    75827        Timed Treatment:  26    mins   Total Treatment:     46   mins    Adalid Genao PT, CLT  Physical Therapist  [place] License:  # [id number]

## 2024-12-18 RX ORDER — EZETIMIBE 10 MG/1
10 TABLET ORAL DAILY
Qty: 90 TABLET | Refills: 3 | Status: SHIPPED | OUTPATIENT
Start: 2024-12-18

## 2024-12-20 ENCOUNTER — TREATMENT (OUTPATIENT)
Dept: PHYSICAL THERAPY | Facility: CLINIC | Age: 64
End: 2024-12-20
Payer: MEDICARE

## 2024-12-20 DIAGNOSIS — M54.59 LUMBAR FACET JOINT PAIN: ICD-10-CM

## 2024-12-20 DIAGNOSIS — M48.062 SPINAL STENOSIS, LUMBAR REGION WITH NEUROGENIC CLAUDICATION: Primary | ICD-10-CM

## 2024-12-20 NOTE — PROGRESS NOTES
Physical Therapy Daily Treatment Note    [facility] [facility], [address]  [place] [address]  [phone]    Patient: Marysol Rollins  : 1960  Referring practitioner: APOLLO Post  Diagnosis/ ICD10 code: Spinal stenosis, lumbar region with neurogenic claudication [M48.062]  Today's Date: 2024    VISIT#: 6    Subjective   Pt reports: feels really stiff this morning, felt pretty good after the traction but was more stiff the next day. Wants to try it again .        Objective     See Exercise, Manual, and Modality Logs for complete treatment.     Patient Education:    Assessment & Plan       Assessment  Assessment details: Pt tolerated today's rx session well. Good initial respond to mechanical traction less pain afterwards but more stiffness the next day.  He is independent with his HEP.           Progress per Plan of Care            Timed:         Manual Therapy:         mins  41683;     Therapeutic Exercise:   18      mins  64115;     Neuromuscular Kamila:  12      mins  53307;    Therapeutic Activity:          mins  50403;     Gait Training:           mins  14617;     Ultrasound:          mins  20722;    Ionto                                   mins   08612  Self Care                            mins   46615      Un-Timed:  Electrical Stimulation:    18    mins  31247 ( );  Traction    18      mins 04538  Canal repositioning           mins    01324        Timed Treatment:  30    mins   Total Treatment:    66   mins    Adalid Genao PT, CLT  Physical Therapist  [place] License:  # [id number]

## 2024-12-27 ENCOUNTER — TREATMENT (OUTPATIENT)
Dept: PHYSICAL THERAPY | Facility: CLINIC | Age: 64
End: 2024-12-27
Payer: MEDICARE

## 2024-12-27 DIAGNOSIS — M48.062 SPINAL STENOSIS, LUMBAR REGION WITH NEUROGENIC CLAUDICATION: Primary | ICD-10-CM

## 2024-12-27 DIAGNOSIS — M54.59 LUMBAR FACET JOINT PAIN: ICD-10-CM

## 2024-12-27 NOTE — PROGRESS NOTES
Physical Therapy Daily Treatment Note    [address] [facility], [address]  [place] [address]  [phone]    Patient: Marysol Rollins  : 1960  Referring practitioner: APOLLO Post  Diagnosis/ ICD10 code: Spinal stenosis, lumbar region with neurogenic claudication [M48.062]  Today's Date: 2024    VISIT#: 7    Subjective   Pt reports: his back is doing pretty well but his R hip still bothering him. Decided to get the Epidural injection. Would like to continue with therapy for a few more visits.  Feels therapy has been beneficial and the stretches and exercises help.       Objective     See Exercise, Manual, and Modality Logs for complete treatment.     Patient Education:    Assessment & Plan       Assessment  Assessment details: Pt tolerated today's rx session well.  Subjective improvement is reported regarding his back but still having issues with R hip.          Progress per Plan of Care            Timed:         Manual Therapy:         mins  69372;     Therapeutic Exercise:   16      mins  95859;     Neuromuscular Kamila:  10      mins  69029;    Therapeutic Activity:          mins  53925;     Gait Training:           mins  63057;     Ultrasound:          mins  06356;    Ionto                                   mins   15588  Self Care                            mins   07144      Un-Timed:  Electrical Stimulation:  18       mins  41981 ( );  Traction    18      mins 54252  Canal repositioning           mins    50679        Timed Treatment:  26    mins   Total Treatment:    62    mins    Adalid Genao PT, CLT  Physical Therapist  [place] License:  # [id number]

## 2025-01-08 ENCOUNTER — TREATMENT (OUTPATIENT)
Dept: PHYSICAL THERAPY | Facility: CLINIC | Age: 65
End: 2025-01-08
Payer: MEDICARE

## 2025-01-08 DIAGNOSIS — M54.59 LUMBAR FACET JOINT PAIN: ICD-10-CM

## 2025-01-08 DIAGNOSIS — M48.062 SPINAL STENOSIS, LUMBAR REGION WITH NEUROGENIC CLAUDICATION: Primary | ICD-10-CM

## 2025-01-08 PROCEDURE — 97012 MECHANICAL TRACTION THERAPY: CPT | Performed by: PHYSICAL THERAPIST

## 2025-01-08 PROCEDURE — 97110 THERAPEUTIC EXERCISES: CPT | Performed by: PHYSICAL THERAPIST

## 2025-01-08 PROCEDURE — 97112 NEUROMUSCULAR REEDUCATION: CPT | Performed by: PHYSICAL THERAPIST

## 2025-01-08 PROCEDURE — G0283 ELEC STIM OTHER THAN WOUND: HCPCS | Performed by: PHYSICAL THERAPIST

## 2025-01-08 NOTE — PROGRESS NOTES
Physical Therapy Daily Treatment Note     [facility], [address]  [place] [address]  [phone]    Patient: Marysol Rollins  : 1960  Referring practitioner: APOLLO Post  Diagnosis/ ICD10 code: Spinal stenosis, lumbar region with neurogenic claudication [M48.062]  Today's Date: 2025    VISIT#: 8    Subjective   Pt reports: his back is doing better and the pain in mostly in the R hip now.  Feels stiff. The more he sits around the more pain he has. Hasn't been doing much lately due to the weather.       Objective     See Exercise, Manual, and Modality Logs for complete treatment.     Patient Education:    Assessment & Plan       Assessment  Assessment details: Pt has responded well to therapy as far as his back pain goes but still having pain and stiffness in R hip.  He is independent with his HEP.         Progress per Plan of Care            Timed:         Manual Therapy:         mins  43115;     Therapeutic Exercise:   16      mins  85152;     Neuromuscular Kamila:  10     mins  90950;    Therapeutic Activity:          mins  65009;     Gait Training:           mins  91858;     Ultrasound:          mins  18372;    Ionto                                   mins   23112  Self Care                            mins   74325      Un-Timed:  Electrical Stimulation:    18     mins  61978 ( );  Traction     18     mins 83336  Canal repositioning           mins    09711        Timed Treatment:  26   mins   Total Treatment:     62   mins    Adalid Genao PT, CLT  Physical Therapist  [place] License:  # [id number]

## 2025-01-10 ENCOUNTER — TREATMENT (OUTPATIENT)
Dept: PHYSICAL THERAPY | Facility: CLINIC | Age: 65
End: 2025-01-10
Payer: MEDICARE

## 2025-01-10 DIAGNOSIS — M54.59 LUMBAR FACET JOINT PAIN: ICD-10-CM

## 2025-01-10 DIAGNOSIS — M48.062 SPINAL STENOSIS, LUMBAR REGION WITH NEUROGENIC CLAUDICATION: Primary | ICD-10-CM

## 2025-01-10 NOTE — PROGRESS NOTES
Physical Therapy Daily Treatment Note    [address] [facility], [address]  [place] [address]  [phone]    Patient: Marysol Rollins  : 1960  Referring practitioner: APOLLO Post  Diagnosis/ ICD10 code: Spinal stenosis, lumbar region with neurogenic claudication [M48.062]  Today's Date: 1/10/2025    VISIT#: 9    Subjective   Pt reports: doing ok this morning. He was a little sore after last rx.        Objective     See Exercise, Manual, and Modality Logs for complete treatment. Continued / progressed with rx as noted.     Patient Education:    Assessment & Plan       Assessment  Assessment details: Pt tolerated today's rx session well.         Progress per Plan of Care            Timed:         Manual Therapy:         mins  55375;     Therapeutic Exercise:   18      mins  29013;     Neuromuscular Kamila:  12      mins  88395;    Therapeutic Activity:          mins  18179;     Gait Training:           mins  55409;     Ultrasound:          mins  54429;    Ionto                                   mins   42749  Self Care                            mins   97367      Un-Timed:  Electrical Stimulation:   18      mins  26545 ( );  Traction    18      mins 55999  Canal repositioning           mins    54335        Timed Treatment:  30    mins   Total Treatment:    66    mins    Adalid Genao, PT, CLT  Physical Therapist  [place] License:  # [id number]

## 2025-01-14 ENCOUNTER — TREATMENT (OUTPATIENT)
Dept: PHYSICAL THERAPY | Facility: CLINIC | Age: 65
End: 2025-01-14
Payer: MEDICARE

## 2025-01-14 DIAGNOSIS — M48.062 SPINAL STENOSIS, LUMBAR REGION WITH NEUROGENIC CLAUDICATION: Primary | ICD-10-CM

## 2025-01-14 DIAGNOSIS — M54.59 LUMBAR FACET JOINT PAIN: ICD-10-CM

## 2025-01-14 PROCEDURE — G0283 ELEC STIM OTHER THAN WOUND: HCPCS | Performed by: PHYSICAL THERAPIST

## 2025-01-14 PROCEDURE — 97110 THERAPEUTIC EXERCISES: CPT | Performed by: PHYSICAL THERAPIST

## 2025-01-14 PROCEDURE — 97112 NEUROMUSCULAR REEDUCATION: CPT | Performed by: PHYSICAL THERAPIST

## 2025-01-14 NOTE — PROGRESS NOTES
Physical Therapy Progress Note/ 10th visit PN    [address] [facility], [address]  [place] [address]  [phone]    Patient: Marysol Rollins  : 1960  Referring practitioner: APOLLO Post  Diagnosis/ ICD10 code: Spinal stenosis, lumbar region with neurogenic claudication [M48.062]  Today's Date: 2025    VISIT#: 10    Subjective   Pt reports: he is sore today seems to be all over. Still has good days/ bad days. Therapy has helped but still has pain depending on how active he is. He did a lot of bending/ lifting and activities on  and he is paying for it today.  His R hip still painful.  Feels the exercises have helped his back and his flexibility.       Objective     See Exercise, Manual, and Modality Logs for complete treatment. Continued with there ex as noted, concluded with ES today. Pt declined traction today.     Patient Education:    Assessment & Plan       Assessment  Assessment details: Pt has tolerated progression of his exercise program. He is doing somewhat better but still is having pain in R hip and tightness in LB and increase pain with increase activities. He is independent with his HEP.  Has met all STGs and 1/4 LTGs met so far.     Oswestry score: 38% ( was 44% at eval )    Goals  Plan Goals: Plan Goals: STGs:  In 4 weeks  1- Pt will  report at least 25 % improvement in functional mobility and pain reduction - MET  2- Pt will be independent with initial HEP - MET  3- Pt will tolerate progression of his exercise program and HEP without increase symptoms- MET        LTGs: By DC   1- Pt will report at least 75 % improvement in functional mobility and pain reduction   2- Pt will be independent with final HEP and self management of his condition   3- Pt will have improved Oswestry score indicating functional improvement , pain and symptom reduction- MET  4- Pt will voice readiness for DC with independent program                 Progress per Plan of Care            Timed:          Manual Therapy:         mins  92101;     Therapeutic Exercise:   18      mins  63303;     Neuromuscular Kamila:  12      mins  01905;    Therapeutic Activity:          mins  10314;     Gait Training:           mins  11877;     Ultrasound:          mins  20532;    Ionto                                   mins   65033  Self Care                            mins   50160      Un-Timed:  Electrical Stimulation:  20      mins  96806 ( );  Traction          mins 65990      Timed Treatment:  30    mins   Total Treatment:    50    mins    Adalid Genao PT, CLT  Physical Therapist  [place] License:  # [id number]

## 2025-01-17 ENCOUNTER — TELEPHONE (OUTPATIENT)
Dept: FAMILY MEDICINE CLINIC | Facility: CLINIC | Age: 65
End: 2025-01-17
Payer: MEDICARE

## 2025-01-17 ENCOUNTER — TREATMENT (OUTPATIENT)
Dept: PHYSICAL THERAPY | Facility: CLINIC | Age: 65
End: 2025-01-17
Payer: MEDICARE

## 2025-01-17 DIAGNOSIS — M54.59 LUMBAR FACET JOINT PAIN: ICD-10-CM

## 2025-01-17 DIAGNOSIS — M48.062 SPINAL STENOSIS, LUMBAR REGION WITH NEUROGENIC CLAUDICATION: Primary | ICD-10-CM

## 2025-01-17 NOTE — TELEPHONE ENCOUNTER
"Patient called asking for a Rheumatology referral \"somewhere in [place]\".   Patient would like to go somewhere closer to home.   Please advise.     "

## 2025-01-17 NOTE — PROGRESS NOTES
Physical Therapy Daily Treatment Note    [address] [facility], [address]  [place] [address]  [phone]    Patient: Marysol Rollins  : 1960  Referring practitioner: APOLLO Post  Diagnosis/ ICD10 code: Spinal stenosis, lumbar region with neurogenic claudication [M48.062]  Today's Date: 2025    VISIT#: 11    Subjective   Pt reports: doing ok this morning, his back feels stiff.  Felt better after last rx wasn't as stiff. Wants to skip traction again today.       Objective     See Exercise, Manual, and Modality Logs for complete treatment.     Patient Education:    Assessment & Plan       Assessment  Assessment details: Good karina to today's rx session. He is independent with his HEP.           Progress per Plan of Care            Timed:         Manual Therapy:         mins  19354;     Therapeutic Exercise:   18      mins  60688;     Neuromuscular Kamila:  12      mins  60103;    Therapeutic Activity:          mins  80682;     Gait Training:           mins  21903;     Ultrasound:          mins  54028;    Ionto                                   mins   23978  Self Care                            mins   79275      Un-Timed:  Electrical Stimulation:  20       mins  58709 ( );  Traction          mins 99751  Canal repositioning           mins    52551        Timed Treatment:  30    mins   Total Treatment:    50    mins    Adalid Genao PT, CLT  Physical Therapist  [place] License:  # [id number]

## 2025-01-21 ENCOUNTER — TREATMENT (OUTPATIENT)
Dept: PHYSICAL THERAPY | Facility: CLINIC | Age: 65
End: 2025-01-21
Payer: MEDICARE

## 2025-01-21 DIAGNOSIS — M48.062 SPINAL STENOSIS, LUMBAR REGION WITH NEUROGENIC CLAUDICATION: Primary | ICD-10-CM

## 2025-01-21 DIAGNOSIS — M54.59 LUMBAR FACET JOINT PAIN: ICD-10-CM

## 2025-01-21 PROCEDURE — 97112 NEUROMUSCULAR REEDUCATION: CPT | Performed by: PHYSICAL THERAPIST

## 2025-01-21 PROCEDURE — 97012 MECHANICAL TRACTION THERAPY: CPT | Performed by: PHYSICAL THERAPIST

## 2025-01-21 PROCEDURE — 97110 THERAPEUTIC EXERCISES: CPT | Performed by: PHYSICAL THERAPIST

## 2025-01-21 PROCEDURE — G0283 ELEC STIM OTHER THAN WOUND: HCPCS | Performed by: PHYSICAL THERAPIST

## 2025-01-21 NOTE — PROGRESS NOTES
Physical Therapy Daily Treatment Note     [facility], [address]  [place] [address]  [phone]    Patient: Marysol Rollins  : 1960  Referring practitioner: APOLLO Post  Diagnosis/ ICD10 code: Spinal stenosis, lumbar region with neurogenic claudication [M48.062]  Today's Date: 2025    VISIT#: 12    Subjective   Pt reports: doing ok this morning.  Has been stiff due to cold weather.  His back is better but the hip still bothers him and feels tight.        Objective     See Exercise, Manual, and Modality Logs for complete treatment. Continued with there ex as noted.  Resumed lumbar traction per pt's request today.      Patient Education:    Assessment & Plan       Assessment  Assessment details: Tolerated today's rx session well.           Progress per Plan of Care            Timed:         Manual Therapy:         mins  30210;     Therapeutic Exercise:    18     mins  30476;     Neuromuscular Kamila:  14     mins  47189;    Therapeutic Activity:          mins  14270;     Gait Training:           mins  42092;     Ultrasound:          mins  38844;    Ionto                                   mins   00364  Self Care                            mins   31936      Un-Timed:  Electrical Stimulation:  18       mins  11538 ( );  Traction    18     mins 96684  Canal repositioning           mins    55132        Timed Treatment:  32    mins   Total Treatment:    68   mins    Adalid Genao PT, CLT  Physical Therapist  [place] License:  # [id number]

## 2025-01-24 ENCOUNTER — TREATMENT (OUTPATIENT)
Dept: PHYSICAL THERAPY | Facility: CLINIC | Age: 65
End: 2025-01-24
Payer: MEDICARE

## 2025-01-24 DIAGNOSIS — M48.062 SPINAL STENOSIS, LUMBAR REGION WITH NEUROGENIC CLAUDICATION: Primary | ICD-10-CM

## 2025-01-24 DIAGNOSIS — M54.59 LUMBAR FACET JOINT PAIN: ICD-10-CM

## 2025-01-24 NOTE — PROGRESS NOTES
Physical Therapy Daily Treatment Note/ DC summary     [facility], [address]  [place] [address]  [phone]    Patient: Marysol Rollins  : 1960  Referring practitioner: APOLLO Post  Diagnosis/ ICD10 code: Spinal stenosis, lumbar region with neurogenic claudication [M48.062]  Today's Date: 2025    VISIT#: 13    Subjective Evaluation    Pain  Current pain ratin  At best pain ratin  At worst pain ratin         Pt reports: doing pretty good this morning.  Feels therapy has been beneficial . His back is definitely better but still has some tightness and pain in R hip.  Feels at least 50% improved.       Objective     See Exercise, Manual, and Modality Logs for complete treatment. Review of his exercises and postural corrections/ body mech.     Patient Education: encouraged to continue with HEP    Assessment & Plan       Assessment  Assessment details: Pt has been seen for 13 visits. He has responded well to therapy reports at least 50% improvement.   He is doing better but still is having pain in R hip and tightness in LB and increase pain with increase activities. He is independent with his HEP.  Voices readiness for DC to continue with his HEP and self management of his condition.  Has met all STGs and 3/4 LTGs.       Oswestry score: 38% ( was 44% at eval )      Goals  Plan Goals: Plan Goals: Plan Goals: STGs:  In 4 weeks  1- Pt will  report at least 25 % improvement in functional mobility and pain reduction - MET  2- Pt will be independent with initial HEP - MET  3- Pt will tolerate progression of his exercise program and HEP without increase symptoms- MET        LTGs: By DC   1- Pt will report at least 75 % improvement in functional mobility and pain reduction   2- Pt will be independent with final HEP and self management of his condition - MET  3- Pt will have improved Oswestry score indicating functional improvement , pain and symptom reduction- MET  4- Pt will voice  readiness for DC with independent program - MET               Plan  Plan details: Pt will be DC                     Timed:         Manual Therapy:         mins  81386;     Therapeutic Exercise:   18      mins  21771;     Neuromuscular Kamila:  14      mins  60390;    Therapeutic Activity:          mins  58532;     Gait Training:           mins  34781;     Ultrasound:          mins  73240;    Ionto                                   mins   24773  Self Care                            mins   47410      Un-Timed:  Electrical Stimulation:   18      mins  71467 (MC );  Traction          mins 11387  Canal repositioning           mins    48945        Timed Treatment: 32     mins   Total Treatment:    50    mins    Adalid Genao PT, CLT  Physical Therapist  [place] License:  # [id number]

## 2025-02-12 ENCOUNTER — TELEPHONE (OUTPATIENT)
Dept: FAMILY MEDICINE CLINIC | Facility: CLINIC | Age: 65
End: 2025-02-12

## 2025-02-12 NOTE — TELEPHONE ENCOUNTER
Requested records sent to Dr. Nely Marroquin.  Patient was made aware that records have been faxed.   To get better and follow your care plan as instructed.

## 2025-02-12 NOTE — TELEPHONE ENCOUNTER
Caller: Marysol Rollins    Relationship: Self    Best call back number: [phone]     What was the call regarding: PATENT STATES THAT HE HAS A NEW RHEUMATOLOGIST, AND WANTED TO KNOW IF DR PIEDRA COULD SEND ANY RELEVANT INFORMATION REGARDING HIS ARTHRITIS TO THAT DOCTOR, INCLUDING TEST RESULTS AND OFFICE NOTES.    DR ELISSA DOSHI  FAX: [phone]

## 2025-02-17 ENCOUNTER — OFFICE VISIT (OUTPATIENT)
Dept: CARDIOLOGY | Facility: CLINIC | Age: 65
End: 2025-02-17
Payer: MEDICARE

## 2025-02-17 VITALS
OXYGEN SATURATION: 97 % | HEART RATE: 59 BPM | WEIGHT: 232 LBS | HEIGHT: 70 IN | BODY MASS INDEX: 33.21 KG/M2 | DIASTOLIC BLOOD PRESSURE: 78 MMHG | SYSTOLIC BLOOD PRESSURE: 142 MMHG | RESPIRATION RATE: 18 BRPM

## 2025-02-17 DIAGNOSIS — G89.29 CHRONIC CHEST PAIN WITH HIGH RISK FOR CAD: ICD-10-CM

## 2025-02-17 DIAGNOSIS — Z91.89 CHRONIC CHEST PAIN WITH HIGH RISK FOR CAD: ICD-10-CM

## 2025-02-17 DIAGNOSIS — R07.9 CHRONIC CHEST PAIN WITH HIGH RISK FOR CAD: ICD-10-CM

## 2025-02-17 DIAGNOSIS — R06.09 DOE (DYSPNEA ON EXERTION): ICD-10-CM

## 2025-02-17 DIAGNOSIS — R07.89 CHEST PAIN, ATYPICAL: Primary | ICD-10-CM

## 2025-02-27 RX ORDER — ATORVASTATIN CALCIUM 40 MG/1
40 TABLET, FILM COATED ORAL DAILY
Qty: 90 TABLET | Refills: 3 | Status: SHIPPED | OUTPATIENT
Start: 2025-02-27

## 2025-02-28 NOTE — PROGRESS NOTES
Cardiology Clinic Note  Bill Oseguera MD, PhD    Subjective:     Encounter Date:02/17/2025      Patient ID: Marysol Rollins is a 64 y.o. male.    Chief Complaint:  Chief Complaint   Patient presents with    Follow-up       HPI:         I the pleasure to see this 63-year-old gentleman in follow-up with history of coronary artery disease.  He had a stent back in 2020, he has hypertension hyperlipidemia and history of MI, is here to establish care, blood pressure 120/84 heart rates in the 60s.  He denies any new onset angina, he is on aspirin statin Plavix but no beta-blockers given heart rates in the 60s and normal blood pressures.  He has a history of persistent rheumatoid arthritis taking Celebrex chronically.  Last 2D echo 2020 revealed an EF of 55% with normal regional and global wall motion, normal RV, normal atrial sizes, no significant valvular abnormality other than trace to mild regurgitation, structurally normal aortic valve, mild aortic root dilatation, cardiac cath report reviewed from March 2020 revealed obstructive disease to the RCA with NSTEMI at that time, overlapping drug-eluting stents were placed to the mid back to proximal RCA, there is residual left-sided disease 60 to 70% but very small caliber distal vessels not optimal for percutaneous intervention that was not [place] was treated medically.  Circumflex was angiographically normal, LAD 60 to 70% very small caliber, diagonal branch small caliber 60% as well, 95% mid RCA with thrombus present that was stented accordingly, stents for Ramón 2.25 x 22 and 2.5 x 18.     He presents back today complaining of recurrent chest pain and dyspnea on exertion.  This is been absent over the last few years but in the last few months has been increasing in frequency and duration.  He stops and this goes away.   He denies heart failure signs or symptoms PND orthopnea palpitations presyncope or excess volume.      Historical data copied forward from previous  "encounters in EMR including the history, exam, and assessment/plan has been reviewed and is unchanged unless noted otherwise.     Cardiac medicines reviewed with risk, benefits, and necessity of each discussed.     Risk and benefit of cardiac testing reviewed including death heart attack stroke pain bleeding infection need for vascular /cardiovascular surgery were discussed and the patient      Objective:     Vitals reviewed below  Physical Exam  Regular rate and rhythm no rubs gallops heaves lift  No clubbing cyanosis or edema  Clear to auscultation  Normal peripheral pulses  Normal CV exam      Assessment:   3     History of MI 2020  Coronary artery disease, borderline obstructive LAD and small caliber 60 to 70% treated medically, RCA with Ramón drug-eluting stents March 2020  Now with recurrent anginal chest pain  Ischemic evaluation and echo ordered with dyspnea exertion along with chest pain    Continue aspirin Plavix  High intensity statin per guidelines  Add Zetia 10 mg daily to his regimen  Not on beta-blocker with normal EF and no chest pain and preserved EF and controlled blood pressure     Essential hypertension controlled  Hyperlipidemia continue statin therapy and aspirin and Plavix for DAPT     Further recommendations follow findings and clinical course  Go to the hospital for any nonresolving chest pain or shortness of breath    Further recommendations follow findings and plan for     Objective:         /78 (BP Location: Right arm, Patient Position: Sitting)   Pulse 59   Resp 18   Ht 177.8 cm (70\")   Wt 105 kg (232 lb)   SpO2 97%   BMI 33.29 kg/m²         Diagnoses and all orders for this visit:    1. Chest pain, atypical (Primary)  -     Stress Test With Myocardial Perfusion One Day; Future  -     Adult Transthoracic Echo Complete W/ Color, Spectral and Contrast if Necessary Per Protocol; Future    2. CAMACHO (dyspnea on exertion)  -     Stress Test With Myocardial Perfusion One Day; Future  - "     Adult Transthoracic Echo Complete W/ Color, Spectral and Contrast if Necessary Per Protocol; Future           Plan:              The pleasure to be involved in this patient's cardiovascular care.  Please call with any questions or concerns  Bill Oseguera MD, PhD    Most recent EKG as reviewed and interpreted by me:    ECG 12 Lead    Date/Time: 2/17/2025 8:48 AM  Performed by: Bill Oseguera MD    Authorized by: Bill Oseguera MD  Comparison: not compared with previous ECG   Previous ECG: no previous ECG available  Rhythm: sinus bradycardia  QRS axis: normal    Clinical impression: non-specific ECG           Most recent echo as reviewed and interpreted by me:      Most recent stress test as reviewed and interpreted by me:      Most recent cardiac catheterization as reviewed interpreted by me:  No results found for this or any previous visit.    The following portions of the patient's history were reviewed and updated as appropriate: allergies, current medications, past family history, past medical history, past social history, past surgical history, and problem list.      ROS:  14 point review of systems negative except as mentioned above    Current Outpatient Medications:     aspirin 81 MG EC tablet, Take 1 tablet by mouth Daily., Disp: 90 tablet, Rfl: 3    cholecalciferol (VITAMIN D3) 1000 units tablet, Take 1 tablet by mouth Daily., Disp: , Rfl:     clopidogrel (PLAVIX) 75 MG tablet, TAKE ONE TABLET BY MOUTH DAILY, Disp: 90 tablet, Rfl: 3    doxazosin (CARDURA) 4 MG tablet, Take 1 tablet by mouth Every Night., Disp: , Rfl:     ezetimibe (ZETIA) 10 MG tablet, Take 1 tablet by mouth Daily., Disp: 90 tablet, Rfl: 3    hydroxychloroquine (PLAQUENIL) 200 MG tablet, Take 1 tablet by mouth 2 (Two) Times a Day., Disp: 180 tablet, Rfl: 1    multivitamin with minerals tablet tablet, Take 1 tablet by mouth Daily., Disp: , Rfl:     nitroglycerin (NITROSTAT) 0.4 MG SL tablet, Place 1 tablet under the  tongue Every 5 (Five) Minutes As Needed for Chest Pain. Take no more than 3 doses in 15 minutes., Disp: 25 tablet, Rfl: 3    Saw Palmetto, Serenoa repens, (SAW PALMETTO PO), Take 1 capsule by mouth Daily., Disp: , Rfl:     sildenafil (REVATIO) 20 MG tablet, Take 1 tablet by mouth Daily As Needed (ED)., Disp: 30 tablet, Rfl: 5    tiZANidine (ZANAFLEX) 4 MG tablet, TAKE ONE TABLET BY MOUTH EVERY 8 HOURS AS NEEDED FOR MUSCLE SPASMS, Disp: 60 tablet, Rfl: 0    atorvastatin (LIPITOR) 40 MG tablet, Take 1 tablet by mouth Daily., Disp: 90 tablet, Rfl: 3    Problem List:  Patient Active Problem List   Diagnosis    Benign fibroma of prostate    HLD (hyperlipidemia)    Intractable episodic cluster headache    Routine adult health maintenance    Screening for colon cancer    Screening for prostate cancer    Medication monitoring encounter    Combined arterial insufficiency and corporo-venous occlusive erectile dysfunction    Thrombocytopenia    Rheumatoid arthritis with rheumatoid factor of multiple sites without organ or systems involvement    Coronary artery disease involving native coronary artery of native heart with angina pectoris    Right hydrocele    Acute myocardial infarction    Esophageal dysphagia    Internal hemorrhoids    Anal fissure    Degeneration of intervertebral disc of lumbar region with discogenic back pain and lower extremity pain    Tibial artery disease    Spinal stenosis, lumbar region, with neurogenic claudication    Lumbar facet joint pain     Past Medical History:  Past Medical History:   Diagnosis Date    Allergic     Seasonal    Arthritis     Rheumtologist care    CAD (coronary artery disease)      Past Surgical History:  Past Surgical History:   Procedure Laterality Date    CARDIAC CATHETERIZATION  03/16/2020    overlapping drug-eluting stents were placed to the mid back to proximal RCA,    COLONOSCOPY  2010    Repeat 10 yr    COLONOSCOPY W/ BIOPSIES  03/29/2021    Dr KIRA Angulo, no polys.     ENDOSCOPY N/A 03/03/2023    Procedure: ESOPHAGOGASTRODUODENOSCOPY with esophageal biopsy and dilation using 54 Fr. Bougie Dilator;  Surgeon: Rei Manuel MD;  Location: [facility] ENDOSCOPY;  Service: Gastroenterology;  Laterality: N/A;  possible candida infection     Social History:  Social History     Socioeconomic History    Marital status:     Number of children: 1    Years of education: Masters   Tobacco Use    Smoking status: Never    Smokeless tobacco: Never    Tobacco comments:     Ocassional cigar!   Vaping Use    Vaping status: Never Used   Substance and Sexual Activity    Alcohol use: Yes     Alcohol/week: 4.0 standard drinks of alcohol     Types: 2 Cans of beer, 2 Shots of liquor per week     Comment: 1-2 every other day    Drug use: No    Sexual activity: Yes     Partners: Female     Birth control/protection: Post-menopausal     Allergies:  No Known Allergies  Immunizations:  Immunization History   Administered Date(s) Administered    COVID-19 (MODERNA) 12YRS+ (SPIKEVAX) 01/04/2024, 09/14/2024    COVID-19 (MODERNA) 1st,2nd,3rd Dose Monovalent 02/05/2021, 03/05/2021, 11/08/2021    COVID-19 (MODERNA) BIVALENT 12+YRS 11/28/2022    COVID-19 (MODERNA) Monovalent Original Booster 08/09/2022    COVID-19 (PFIZER) 12YRS+ (COMIRNATY) 12/27/2023    Flu Vaccine Quad PF >36MO 09/14/2024    Flu Vaccine Split Quad 09/04/2020    Fluzone (or Fluarix & Flulaval for VFC) >6mos 09/04/2020, 09/02/2021, 01/05/2023, 12/27/2023    Influenza Split Preservative Free ID 01/04/2024    Shingrix 12/01/2022, 12/21/2022, 04/24/2023    flucelvax quad pfs =>4 YRS 01/15/2019            In-Office Procedure(s):  No orders to display        ASCVD RIsk Score::  The ASCVD Risk score (Zeb HERNANDEZ, et al., 2019) failed to calculate for the following reasons:    Risk score cannot be calculated because patient has a medical history suggesting prior/existing ASCVD    Imaging:    Results for orders placed in visit on 09/15/21    SCANNED -  IMAGING       Results for orders placed during the hospital encounter of 10/18/24    CT Angio Abdominal Aorta Bilateral Iliofem Runoff    Narrative  CT ANGIO ABDOMINAL AORTA BILAT ILIOFEM RUNOFF    Date of Exam: 10/18/2024 4:49 PM EDT    Indication: bilateral leg pain. lumbar pain. PAD. coronary stents.    Comparison: None available.    Technique: CTA of the abdomen, pelvis and both lower extremities was performed before and after the uneventful intravenous administration of iodinated contrast. Reconstructed coronal and sagittal images were also obtained. In addition, a 3-D volume  rendered image was created for interpretation. Automated exposure control and iterative reconstruction methods were used.      Findings:  Vascular:    No significant celiac, SMA or renal artery stenosis identified. There is mild calcified and noncalcified plaquing in the infrarenal abdominal aorta. The infrarenal abdominal aorta is mildly ectatic measuring up to 2.7 cm.    Iliac artery segments: There is generalized ectasia of the iliac and proximal femoral segments which may indicate arterial magnet. There is mild plaquing in the right common iliac artery and proximal internal iliac arteries bilaterally. No significant  aortoiliac arterial occlusive disease is identified on either side.    Right leg: The right common femoral and femoral bifurcation are free of significant stenosis. There is mild scattered plaquing in the proximal to mid right SFA. Mild to moderate calcified plaquing in the distal right SFA. Degree of stenosis appears less  than 50%. The right popliteal artery appears free of significant stenosis above and below the knee. Below the knee on the right, there is proximal occlusion of the anterior tibial artery. There is mild calcific disease along the tibial peroneal trunk.  There is high-grade stenosis involving the peroneal artery proximally. The right posterior tibial artery appears to be the solitary inline flow to the  right foot.    Left leg: The left common femoral and femoral bifurcation are free of significant stenosis. There is scattered mild calcified plaquing in the proximal to mid left SFA, but no significant stenosis. The left popliteal appears free of significant stenosis  above and below the knee. Below the knee on the left, there is proximal occlusion of the anterior tibial artery. There is at least moderate calcific disease involving the tibial peroneal trunk. There is otherwise intact two-vessel runoff to the left foot  via the posterior tibial and peroneal arteries.    Nonvascular:    Visualized lung bases are clear. The spleen, pancreas, adrenals, liver and gallbladder have a grossly normal appearance. Bilateral probable renal cysts are noted. There is a small hiatal hernia. There is nonspecific circumferential thickening of the  distal esophagus. There is no evidence of small bowel obstruction, free air or free fluid. Appendix is unremarkable. No pericolonic inflammatory changes identified. Prostate is enlarged, measuring 6.1 cm in axial dimension. Urinary bladder is  unremarkable. There are bilateral hydroceles, right greater than left. No gross adenopathy is identified in the abdomen or pelvis. There is degenerative change in the spine. There is degenerative change in the hips, right greater than left.    Impression  Impression:    1. No significant aortoiliac arterial occlusive disease identified on either side.  2. No significant femoral-popliteal arterial occlusive disease identified on either side.  3. Significant infrapopliteal arterial occlusive disease is identified bilaterally. Please see above discussion.  4. Additional vascular and nonvascular findings as given above.      Electronically Signed: Joni Diggs MD  10/22/2024 11:31 AM EDT  Workstation ID: [id number]      Results for orders placed during the hospital encounter of 10/18/24    CT Angio Abdominal Aorta Bilateral Iliofem Runoff    Narrative  CT  ANGIO ABDOMINAL AORTA BILAT ILIOFEM RUNOFF    Date of Exam: 10/18/2024 4:49 PM EDT    Indication: bilateral leg pain. lumbar pain. PAD. coronary stents.    Comparison: None available.    Technique: CTA of the abdomen, pelvis and both lower extremities was performed before and after the uneventful intravenous administration of iodinated contrast. Reconstructed coronal and sagittal images were also obtained. In addition, a 3-D volume  rendered image was created for interpretation. Automated exposure control and iterative reconstruction methods were used.      Findings:  Vascular:    No significant celiac, SMA or renal artery stenosis identified. There is mild calcified and noncalcified plaquing in the infrarenal abdominal aorta. The infrarenal abdominal aorta is mildly ectatic measuring up to 2.7 cm.    Iliac artery segments: There is generalized ectasia of the iliac and proximal femoral segments which may indicate arterial magnet. There is mild plaquing in the right common iliac artery and proximal internal iliac arteries bilaterally. No significant  aortoiliac arterial occlusive disease is identified on either side.    Right leg: The right common femoral and femoral bifurcation are free of significant stenosis. There is mild scattered plaquing in the proximal to mid right SFA. Mild to moderate calcified plaquing in the distal right SFA. Degree of stenosis appears less  than 50%. The right popliteal artery appears free of significant stenosis above and below the knee. Below the knee on the right, there is proximal occlusion of the anterior tibial artery. There is mild calcific disease along the tibial peroneal trunk.  There is high-grade stenosis involving the peroneal artery proximally. The right posterior tibial artery appears to be the solitary inline flow to the right foot.    Left leg: The left common femoral and femoral bifurcation are free of significant stenosis. There is scattered mild calcified plaquing in  the proximal to mid left SFA, but no significant stenosis. The left popliteal appears free of significant stenosis  above and below the knee. Below the knee on the left, there is proximal occlusion of the anterior tibial artery. There is at least moderate calcific disease involving the tibial peroneal trunk. There is otherwise intact two-vessel runoff to the left foot  via the posterior tibial and peroneal arteries.    Nonvascular:    Visualized lung bases are clear. The spleen, pancreas, adrenals, liver and gallbladder have a grossly normal appearance. Bilateral probable renal cysts are noted. There is a small hiatal hernia. There is nonspecific circumferential thickening of the  distal esophagus. There is no evidence of small bowel obstruction, free air or free fluid. Appendix is unremarkable. No pericolonic inflammatory changes identified. Prostate is enlarged, measuring 6.1 cm in axial dimension. Urinary bladder is  unremarkable. There are bilateral hydroceles, right greater than left. No gross adenopathy is identified in the abdomen or pelvis. There is degenerative change in the spine. There is degenerative change in the hips, right greater than left.    Impression  Impression:    1. No significant aortoiliac arterial occlusive disease identified on either side.  2. No significant femoral-popliteal arterial occlusive disease identified on either side.  3. Significant infrapopliteal arterial occlusive disease is identified bilaterally. Please see above discussion.  4. Additional vascular and nonvascular findings as given above.      Electronically Signed: Joni Diggs MD  10/22/2024 11:31 AM EDT  Workstation ID: [id number]      Lab Review:   Hospital Outpatient Visit on 10/18/2024   Component Date Value    Creatinine 10/18/2024 1.30     eGFR 10/18/2024 61.0    Results Encounter on 09/19/2024   Component Date Value    WBC 09/19/2024 5.67     RBC 09/19/2024 5.17     Hemoglobin 09/19/2024 14.6     Hematocrit  09/19/2024 44.6     MCV 09/19/2024 86.3     MCH 09/19/2024 28.2     MCHC 09/19/2024 32.7     RDW 09/19/2024 12.7     Platelets 09/19/2024 146     Glucose 09/19/2024 83     BUN 09/19/2024 16     Creatinine 09/19/2024 1.42 (H)     EGFR Result 09/19/2024 55.2 (L)     BUN/Creatinine Ratio 09/19/2024 11.3     Sodium 09/19/2024 142     Potassium 09/19/2024 4.7     Chloride 09/19/2024 105     Total CO2 09/19/2024 25.6     Calcium 09/19/2024 9.6     Total Protein 09/19/2024 7.1     Albumin 09/19/2024 4.3     Globulin 09/19/2024 2.8     A/G Ratio 09/19/2024 1.5     Total Bilirubin 09/19/2024 0.7     Alkaline Phosphatase 09/19/2024 49     AST (SGOT) 09/19/2024 31     ALT (SGPT) 09/19/2024 23     Total Cholesterol 09/19/2024 117     Triglycerides 09/19/2024 63     HDL Cholesterol 09/19/2024 52     VLDL Cholesterol [place] 09/19/2024 14     LDL Chol Calc (NIH) 09/19/2024 51     LDL/HDL RATIO 09/19/2024 1.01     PSA 09/19/2024 3.370     Specific Gravity, UA 09/19/2024 1.025     pH, UA 09/19/2024 6.0     Color, UA 09/19/2024 Yellow     Appearance, UA 09/19/2024 Clear     Leukocytes, UA 09/19/2024 Trace (A)     Protein 09/19/2024 See below: (A)     Glucose, UA 09/19/2024 Negative     Ketones 09/19/2024 Negative     Blood, UA 09/19/2024 Negative     Bilirubin, UA 09/19/2024 Negative     Urobilinogen, UA 09/19/2024 Comment     Nitrite, UA 09/19/2024 Negative     WBC, UA 09/19/2024 0-2     RBC, UA 09/19/2024 0-2     Epithelial Cells (non re* 09/19/2024 0-2     Cast Type 09/19/2024 Comment     Bacteria, UA 09/19/2024 Comment      Recent labs reviewed and interpreted for clinical significance and application            Level of Care:           Bill Oseguera MD  02/28/25  .

## 2025-03-11 ENCOUNTER — HOSPITAL ENCOUNTER (OUTPATIENT)
Dept: NUCLEAR MEDICINE | Facility: HOSPITAL | Age: 65
Discharge: HOME | End: 2025-03-11
Payer: MEDICARE

## 2025-03-11 ENCOUNTER — HOSPITAL ENCOUNTER (OUTPATIENT)
Dept: CARDIOLOGY | Facility: HOSPITAL | Age: 65
Discharge: HOME | End: 2025-03-11
Payer: MEDICARE

## 2025-03-11 DIAGNOSIS — R07.89 CHEST PAIN, ATYPICAL: ICD-10-CM

## 2025-03-11 DIAGNOSIS — R06.09 DOE (DYSPNEA ON EXERTION): ICD-10-CM

## 2025-03-11 LAB
BH CV NUCLEAR PRIOR STUDY: 3
BH CV REST NUCLEAR ISOTOPE DOSE: 11 MCI
BH CV STRESS BP STAGE 1: NORMAL
BH CV STRESS BP STAGE 2: NORMAL
BH CV STRESS BP STAGE 3: NORMAL
BH CV STRESS DURATION MIN STAGE 1: 3
BH CV STRESS DURATION MIN STAGE 2: 3
BH CV STRESS DURATION MIN STAGE 3: 3
BH CV STRESS DURATION SEC STAGE 1: 0
BH CV STRESS DURATION SEC STAGE 2: 0
BH CV STRESS DURATION SEC STAGE 3: 0
BH CV STRESS GRADE STAGE 1: 10
BH CV STRESS GRADE STAGE 2: 12
BH CV STRESS GRADE STAGE 3: 14
BH CV STRESS HR STAGE 1: 92
BH CV STRESS HR STAGE 2: 114
BH CV STRESS HR STAGE 3: 135
BH CV STRESS METS STAGE 1: 5
BH CV STRESS METS STAGE 2: 7.5
BH CV STRESS METS STAGE 3: 10
BH CV STRESS NUCLEAR ISOTOPE DOSE: 33 MCI
BH CV STRESS PROTOCOL 1: NORMAL
BH CV STRESS RECOVERY BP: NORMAL MMHG
BH CV STRESS RECOVERY HR: 82 BPM
BH CV STRESS SPEED STAGE 1: 1.7
BH CV STRESS SPEED STAGE 2: 2.5
BH CV STRESS SPEED STAGE 3: 3.4
BH CV STRESS STAGE 1: 1
BH CV STRESS STAGE 2: 2
BH CV STRESS STAGE 3: 3
MAXIMAL PREDICTED HEART RATE: 155 BPM
PERCENT MAX PREDICTED HR: 87.1 %
SPECT HRT GATED+EF W RNC IV: 69 %
STRESS BASELINE BP: NORMAL MMHG
STRESS BASELINE HR: 68 BPM
STRESS PERCENT HR: 102 %
STRESS POST ESTIMATED WORKLOAD: 9.3 METS
STRESS POST EXERCISE DUR MIN: 7 MIN
STRESS POST EXERCISE DUR SEC: 25 SEC
STRESS POST PEAK BP: NORMAL MMHG
STRESS POST PEAK HR: 135 BPM
STRESS TARGET HR: 132 BPM

## 2025-03-11 PROCEDURE — A9502 TC99M TETROFOSMIN: HCPCS | Performed by: INTERNAL MEDICINE

## 2025-03-11 PROCEDURE — 34310000005 TECHNETIUM TETROFOSMIN KIT: Performed by: INTERNAL MEDICINE

## 2025-03-11 PROCEDURE — 93356 MYOCRD STRAIN IMG SPCKL TRCK: CPT

## 2025-03-11 PROCEDURE — 93017 CV STRESS TEST TRACING ONLY: CPT

## 2025-03-11 PROCEDURE — 78452 HT MUSCLE IMAGE SPECT MULT: CPT

## 2025-03-11 PROCEDURE — 93306 TTE W/DOPPLER COMPLETE: CPT

## 2025-03-11 RX ADMIN — TETROFOSMIN 1 DOSE: 1.38 INJECTION, POWDER, LYOPHILIZED, FOR SOLUTION INTRAVENOUS at 10:23

## 2025-03-11 RX ADMIN — TETROFOSMIN 1 DOSE: 1.38 INJECTION, POWDER, LYOPHILIZED, FOR SOLUTION INTRAVENOUS at 11:41

## 2025-03-12 LAB
AV MEAN PRESS GRAD SYS DOP V1V2: 2 MMHG
AV VMAX SYS DOP: 97.7 CM/SEC
BH CV ECHO LEFT VENTRICLE GLOBAL LONGITUDINAL STRAIN: -17.7 %
BH CV ECHO MEAS - ACS: 2.4 CM
BH CV ECHO MEAS - AO MAX PG: 3.8 MMHG
BH CV ECHO MEAS - AO V2 VTI: 24.2 CM
BH CV ECHO MEAS - AVA(I,D): 3.2 CM2
BH CV ECHO MEAS - EDV(CUBED): 125 ML
BH CV ECHO MEAS - EDV(MOD-SP4): 128 ML
BH CV ECHO MEAS - EF(MOD-SP4): 56.6 %
BH CV ECHO MEAS - ESV(CUBED): 42.9 ML
BH CV ECHO MEAS - ESV(MOD-SP4): 55.6 ML
BH CV ECHO MEAS - FS: 30 %
BH CV ECHO MEAS - IVS/LVPW: 1 CM
BH CV ECHO MEAS - IVSD: 1 CM
BH CV ECHO MEAS - LA DIMENSION: 4.2 CM
BH CV ECHO MEAS - LAT PEAK E' VEL: 10.3 CM/SEC
BH CV ECHO MEAS - LV DIASTOLIC VOL/BSA (35-75): 57.6 CM2
BH CV ECHO MEAS - LV MASS(C)D: 182 GRAMS
BH CV ECHO MEAS - LV MAX PG: 2.6 MMHG
BH CV ECHO MEAS - LV MEAN PG: 1 MMHG
BH CV ECHO MEAS - LV SYSTOLIC VOL/BSA (12-30): 25 CM2
BH CV ECHO MEAS - LV V1 MAX: 81.3 CM/SEC
BH CV ECHO MEAS - LV V1 VTI: 20.3 CM
BH CV ECHO MEAS - LVIDD: 5 CM
BH CV ECHO MEAS - LVIDS: 3.5 CM
BH CV ECHO MEAS - LVOT AREA: 3.8 CM2
BH CV ECHO MEAS - LVOT DIAM: 2.2 CM
BH CV ECHO MEAS - LVPWD: 1 CM
BH CV ECHO MEAS - MED PEAK E' VEL: 6.7 CM/SEC
BH CV ECHO MEAS - MR MAX PG: 59.9 MMHG
BH CV ECHO MEAS - MR MAX VEL: 387 CM/SEC
BH CV ECHO MEAS - MV A MAX VEL: 69.2 CM/SEC
BH CV ECHO MEAS - MV DEC SLOPE: 207 CM/SEC2
BH CV ECHO MEAS - MV DEC TIME: 0.17 SEC
BH CV ECHO MEAS - MV E MAX VEL: 83.2 CM/SEC
BH CV ECHO MEAS - MV E/A: 1.2
BH CV ECHO MEAS - MV MAX PG: 3.2 MMHG
BH CV ECHO MEAS - MV MEAN PG: 1 MMHG
BH CV ECHO MEAS - MV P1/2T: 103.7 MSEC
BH CV ECHO MEAS - MV V2 VTI: 25.7 CM
BH CV ECHO MEAS - MVA(P1/2T): 2.12 CM2
BH CV ECHO MEAS - MVA(VTI): 3 CM2
BH CV ECHO MEAS - PA ACC TIME: 0.18 SEC
BH CV ECHO MEAS - PA V2 MAX: 63.4 CM/SEC
BH CV ECHO MEAS - PULM A REVS DUR: 0.14 SEC
BH CV ECHO MEAS - PULM A REVS VEL: 28.7 CM/SEC
BH CV ECHO MEAS - PULM DIAS VEL: 41.5 CM/SEC
BH CV ECHO MEAS - PULM S/D: 1.29
BH CV ECHO MEAS - PULM SYS VEL: 53.4 CM/SEC
BH CV ECHO MEAS - RAP SYSTOLE: 3 MMHG
BH CV ECHO MEAS - RV MAX PG: 1.66 MMHG
BH CV ECHO MEAS - RV V1 MAX: 64.4 CM/SEC
BH CV ECHO MEAS - RV V1 VTI: 15.5 CM
BH CV ECHO MEAS - SV(LVOT): 77.2 ML
BH CV ECHO MEAS - SV(MOD-SP4): 72.4 ML
BH CV ECHO MEAS - SVI(LVOT): 34.7 ML/M2
BH CV ECHO MEAS - SVI(MOD-SP4): 32.6 ML/M2
BH CV ECHO MEAS - TAPSE (>1.6): 1.99 CM
BH CV ECHO MEASUREMENTS AVERAGE E/E' RATIO: 9.79
BH CV XLRA - RV BASE: 2.9 CM
BH CV XLRA - RV LENGTH: 7 CM
BH CV XLRA - RV MID: 1.9 CM
BH CV XLRA - TDI S': 10.6 CM/SEC
LEFT ATRIUM VOLUME INDEX: 24 ML/M2
LV EF BIPLANE MOD: 57 %
SINUS: 3.6 CM
STJ: 3.1 CM

## 2025-03-20 RX ORDER — ASPIRIN 81 MG/1
81 TABLET ORAL DAILY
Qty: 90 TABLET | Refills: 3 | Status: SHIPPED | OUTPATIENT
Start: 2025-03-20

## 2025-05-14 RX ORDER — CLOPIDOGREL BISULFATE 75 MG/1
75 TABLET ORAL DAILY
Qty: 90 TABLET | Refills: 3 | Status: SHIPPED | OUTPATIENT
Start: 2025-05-14

## 2025-05-21 ENCOUNTER — OFFICE VISIT (OUTPATIENT)
Dept: FAMILY MEDICINE CLINIC | Facility: CLINIC | Age: 65
End: 2025-05-21
Payer: MEDICARE

## 2025-05-21 ENCOUNTER — TELEPHONE (OUTPATIENT)
Dept: FAMILY MEDICINE CLINIC | Facility: CLINIC | Age: 65
End: 2025-05-21

## 2025-05-21 VITALS
HEART RATE: 62 BPM | TEMPERATURE: 97.8 F | WEIGHT: 226 LBS | SYSTOLIC BLOOD PRESSURE: 126 MMHG | OXYGEN SATURATION: 98 % | BODY MASS INDEX: 32.35 KG/M2 | DIASTOLIC BLOOD PRESSURE: 80 MMHG | HEIGHT: 70 IN

## 2025-05-21 DIAGNOSIS — M54.9 ACUTE LEFT-SIDED BACK PAIN, UNSPECIFIED BACK LOCATION: Primary | ICD-10-CM

## 2025-05-21 PROBLEM — H35.379 EPIRETINAL MEMBRANE: Status: ACTIVE | Noted: 2025-02-20

## 2025-05-21 LAB
BILIRUB BLD-MCNC: NEGATIVE MG/DL
CLARITY, POC: CLEAR
COLOR UR: YELLOW
GLUCOSE UR STRIP-MCNC: NEGATIVE MG/DL
KETONES UR QL: NEGATIVE
LEUKOCYTE EST, POC: NEGATIVE
NITRITE UR-MCNC: NEGATIVE MG/ML
PH UR: 6 [PH] (ref 5–8)
PROT UR STRIP-MCNC: NEGATIVE MG/DL
RBC # UR STRIP: NEGATIVE /UL
SP GR UR: 1.02 (ref 1–1.03)
UROBILINOGEN UR QL: NORMAL

## 2025-05-21 PROCEDURE — 1125F AMNT PAIN NOTED PAIN PRSNT: CPT | Performed by: FAMILY MEDICINE

## 2025-05-21 PROCEDURE — 99213 OFFICE O/P EST LOW 20 MIN: CPT | Performed by: FAMILY MEDICINE

## 2025-05-21 PROCEDURE — 81002 URINALYSIS NONAUTO W/O SCOPE: CPT | Performed by: FAMILY MEDICINE

## 2025-05-21 NOTE — TELEPHONE ENCOUNTER
Caller: Marysol Rollins    Relationship: Self    Best call back number: [phone]     What was the call regarding: PATIENT CALLED TO UPDATE WITH RESULTS FROM TEST WITH DR DEAL. ON 4/30/25, HIS PSA TEST WAS 3.68

## 2025-05-21 NOTE — PROGRESS NOTES
"  Subjective   Marysol Rollins is a 64 y.o. male who is here for   Chief Complaint   Patient presents with    Pain     Left posterior rib area   .     History of Present Illness     Sore left flank rib thoracic area for several months.  Pain comes and goes.  Hurts with range of motion.  No injury no rash.    The following portions of the patient's history were reviewed and updated as appropriate: allergies, current medications, past medical history, past social history, past surgical history, and problem list.    Review of Systems    Objective   Vitals:    05/21/25 1012   BP: 126/80   BP Location: Left arm   Patient Position: Sitting   Cuff Size: Adult   Pulse: 62   Temp: 97.8 °F (36.6 °C)   SpO2: 98%   Weight: 103 kg (226 lb)   Height: 177.8 cm (70\")      Physical Exam  Vitals reviewed.   Pulmonary:          Comments: .  Tenderness.  No rash  Musculoskeletal:         General: Tenderness present.   Neurological:      Mental Status: He is alert.     Lungs clear  Results for orders placed or performed in visit on 05/21/25   POC Urinalysis Dipstick    Collection Time: 05/21/25 10:29 AM    Specimen: Urine   Result Value Ref Range    Color Yellow Yellow, Straw, Dark Yellow, Heidi    Clarity, UA Clear Clear    Glucose, UA Negative Negative mg/dL    Bilirubin Negative Negative    Ketones, UA Negative Negative    Specific Gravity  1.020 1.005 - 1.030    Blood, UA Negative Negative    pH, Urine 6.0 5.0 - 8.0    Protein, POC Negative Negative mg/dL    Urobilinogen, UA Normal Normal, 0.2 E.U./dL    Leukocytes Negative Negative    Nitrite, UA Negative Negative         Assessment & Plan   Diagnoses and all orders for this visit:    1. Acute left-sided back pain, unspecified back location (Primary)  -     POC Urinalysis Dipstick    In office urine dipstick is normal  Exam unrevealing,    Suggest over-the-counter Salonpas patches  He explained he might have a prostate biopsy done next week, so would need to avoid " anti-inflammatories  There are no Patient Instructions on file for this visit.    There are no discontinued medications.     No follow-ups on file.    Dr. Juan Fernandez  [place], [place]

## 2025-06-23 ENCOUNTER — TELEPHONE (OUTPATIENT)
Dept: FAMILY MEDICINE CLINIC | Facility: CLINIC | Age: 65
End: 2025-06-23

## 2025-06-25 ENCOUNTER — TELEPHONE (OUTPATIENT)
Dept: FAMILY MEDICINE CLINIC | Facility: CLINIC | Age: 65
End: 2025-06-25
Payer: MEDICARE

## 2025-06-25 NOTE — TELEPHONE ENCOUNTER
Called pt regarding note. Pt has scheduled appointment with PCP to talk further about recommendation.     ----- Message from Juan Fernandez sent at 6/25/2025  1:10 PM EDT -----  Called about his prostate cancer.  Reviewed the note from the urologist  Who recommends a prostatectomy.  I also recommend going forward with the prostatectomy surgery  ----- Message -----  From: Lizzie Valdovinos  Sent: 6/23/2025   6:26 PM EDT  To: Juan Fernandez MD

## 2025-06-30 ENCOUNTER — OFFICE VISIT (OUTPATIENT)
Dept: FAMILY MEDICINE CLINIC | Facility: CLINIC | Age: 65
End: 2025-06-30
Payer: MEDICARE

## 2025-06-30 VITALS
BODY MASS INDEX: 31.9 KG/M2 | DIASTOLIC BLOOD PRESSURE: 76 MMHG | WEIGHT: 222.8 LBS | HEART RATE: 71 BPM | HEIGHT: 70 IN | TEMPERATURE: 97.5 F | SYSTOLIC BLOOD PRESSURE: 126 MMHG | OXYGEN SATURATION: 98 % | RESPIRATION RATE: 14 BRPM

## 2025-06-30 DIAGNOSIS — C61 PROSTATE CANCER: Primary | ICD-10-CM

## 2025-06-30 PROCEDURE — 99213 OFFICE O/P EST LOW 20 MIN: CPT | Performed by: FAMILY MEDICINE

## 2025-06-30 PROCEDURE — 1160F RVW MEDS BY RX/DR IN RCRD: CPT | Performed by: FAMILY MEDICINE

## 2025-06-30 PROCEDURE — 1125F AMNT PAIN NOTED PAIN PRSNT: CPT | Performed by: FAMILY MEDICINE

## 2025-06-30 PROCEDURE — 1159F MED LIST DOCD IN RCRD: CPT | Performed by: FAMILY MEDICINE

## 2025-06-30 RX ORDER — ALFUZOSIN HYDROCHLORIDE 10 MG/1
TABLET, EXTENDED RELEASE ORAL
COMMUNITY
Start: 2025-06-20

## 2025-06-30 NOTE — PROGRESS NOTES
"  Subjective   Marysol Rollins is a 64 y.o. male who is here for   Chief Complaint   Patient presents with    Procedure     Discuss options   .     History of Present Illness     Jackson comes in today with his wife to discuss his recent diagnosis of prostate cancer.  [facility] is [facility]  Also has BPH  Prostate MRI was performed  Mass was seen  Prostate biopsy was completed  One of the core biopsies came up with prostate cancer Malachi score 7  He had acute urine obstruction with gross hematuria after prostate biopsy  Snowden was placed and then eventually removed  He has a follow-up with urology this week to discuss options      The following portions of the patient's history were reviewed and updated as appropriate: allergies, current medications, past family history, past medical history, past social history, past surgical history, and problem list.    Review of Systems    Objective   Vitals:    06/30/25 0923   BP: 126/76   BP Location: Left arm   Patient Position: Sitting   Cuff Size: Adult   Pulse: 71   Resp: 14   Temp: 97.5 °F (36.4 °C)   TempSrc: Infrared   SpO2: 98%   Weight: 101 kg (222 lb 12.8 oz)   Height: 177.8 cm (70\")      Physical Exam  Vitals reviewed.   Neurological:      Mental Status: He is alert.         Assessment & Plan   Diagnoses and all orders for this visit:    1. Prostate cancer (Primary)    Discussed with patient and wife  Urology notes discussed  Follow-up with urology this week  Would recommend a more definitive prostate cancer treatment with one of the urologists, with surgery  There are no Patient Instructions on file for this visit.    Medications Discontinued During This Encounter   Medication Reason    doxazosin (CARDURA) 4 MG tablet Discontinued by another clinician        No follow-ups on file.    Dr. Juan Fernandez  [place], [place]    "

## (undated) DEVICE — SINGLE-USE BIOPSY FORCEPS: Brand: RADIAL JAW 4

## (undated) DEVICE — BITEBLOCK ENDO W/STRAP 60F A/ LF DISP

## (undated) DEVICE — PK ENDO GI 50